# Patient Record
Sex: MALE | URBAN - METROPOLITAN AREA
[De-identification: names, ages, dates, MRNs, and addresses within clinical notes are randomized per-mention and may not be internally consistent; named-entity substitution may affect disease eponyms.]

---

## 2017-02-09 PROBLEM — M47.22 OSTEOARTHRITIS OF SPINE WITH RADICULOPATHY, CERVICAL REGION: Status: ACTIVE | Noted: 2017-02-09

## 2017-02-09 PROBLEM — M51.16 INTERVERTEBRAL DISC DISORDERS WITH RADICULOPATHY, LUMBAR REGION: Status: ACTIVE | Noted: 2017-02-09

## 2018-09-07 ENCOUNTER — HOSPITAL ENCOUNTER (OUTPATIENT)
Age: 58
Discharge: HOME | End: 2018-09-09
Payer: COMMERCIAL

## 2018-09-07 LAB
ALBUMIN SERPL-MCNC: 4.2 G/DL (ref 3.5–5.2)
ALP BLD-CCNC: 46 U/L (ref 40–129)
ALT SERPL-CCNC: 24 U/L (ref 0–40)
ANION GAP SERPL CALCULATED.3IONS-SCNC: 19 MMOL/L (ref 7–16)
AST SERPL-CCNC: 21 U/L (ref 0–39)
BASOPHILS ABSOLUTE: 0.07 E9/L (ref 0–0.2)
BASOPHILS RELATIVE PERCENT: 0.9 % (ref 0–2)
BILIRUB SERPL-MCNC: 0.2 MG/DL (ref 0–1.2)
BUN BLDV-MCNC: 26 MG/DL (ref 6–20)
CALCIUM SERPL-MCNC: 9.7 MG/DL (ref 8.6–10.2)
CHLORIDE BLD-SCNC: 99 MMOL/L (ref 98–107)
CHOLESTEROL, TOTAL: 194 MG/DL (ref 0–199)
CO2: 20 MMOL/L (ref 22–29)
CREAT SERPL-MCNC: 1.1 MG/DL (ref 0.7–1.2)
EOSINOPHILS ABSOLUTE: 0.13 E9/L (ref 0.05–0.5)
EOSINOPHILS RELATIVE PERCENT: 1.7 % (ref 0–6)
GFR AFRICAN AMERICAN: >60
GFR NON-AFRICAN AMERICAN: >60 ML/MIN/1.73
GLUCOSE BLD-MCNC: 160 MG/DL (ref 74–109)
HBA1C MFR BLD: 7.5 % (ref 4–5.6)
HCT VFR BLD CALC: 48.5 % (ref 37–54)
HDLC SERPL-MCNC: 23 MG/DL
HEMOGLOBIN: 15 G/DL (ref 12.5–16.5)
IMMATURE GRANULOCYTES #: 0.03 E9/L
IMMATURE GRANULOCYTES %: 0.4 % (ref 0–5)
LDL CHOLESTEROL CALCULATED: ABNORMAL MG/DL (ref 0–99)
LYMPHOCYTES ABSOLUTE: 1.74 E9/L (ref 1.5–4)
LYMPHOCYTES RELATIVE PERCENT: 22.2 % (ref 20–42)
MCH RBC QN AUTO: 27.5 PG (ref 26–35)
MCHC RBC AUTO-ENTMCNC: 30.9 % (ref 32–34.5)
MCV RBC AUTO: 88.8 FL (ref 80–99.9)
MICROALBUMIN UR-MCNC: 110 MG/L
MONOCYTES ABSOLUTE: 0.59 E9/L (ref 0.1–0.95)
MONOCYTES RELATIVE PERCENT: 7.5 % (ref 2–12)
NEUTROPHILS ABSOLUTE: 5.28 E9/L (ref 1.8–7.3)
NEUTROPHILS RELATIVE PERCENT: 67.3 % (ref 43–80)
PDW BLD-RTO: 15.1 FL (ref 11.5–15)
PLATELET # BLD: 411 E9/L (ref 130–450)
PMV BLD AUTO: 9.8 FL (ref 7–12)
POTASSIUM SERPL-SCNC: 4.5 MMOL/L (ref 3.5–5)
RBC # BLD: 5.46 E12/L (ref 3.8–5.8)
SODIUM BLD-SCNC: 138 MMOL/L (ref 132–146)
TOTAL PROTEIN: 7.7 G/DL (ref 6.4–8.3)
TRIGL SERPL-MCNC: 705 MG/DL (ref 0–149)
VLDLC SERPL CALC-MCNC: ABNORMAL MG/DL
WBC # BLD: 7.8 E9/L (ref 4.5–11.5)

## 2018-09-07 PROCEDURE — 80061 LIPID PANEL: CPT

## 2018-09-07 PROCEDURE — 80053 COMPREHEN METABOLIC PANEL: CPT

## 2018-09-07 PROCEDURE — 85025 COMPLETE CBC W/AUTO DIFF WBC: CPT

## 2018-09-07 PROCEDURE — 82044 UR ALBUMIN SEMIQUANTITATIVE: CPT

## 2018-09-07 PROCEDURE — 83036 HEMOGLOBIN GLYCOSYLATED A1C: CPT

## 2018-12-10 ENCOUNTER — HOSPITAL ENCOUNTER (OUTPATIENT)
Age: 58
Discharge: HOME | End: 2018-12-12
Payer: COMMERCIAL

## 2018-12-10 LAB
ALBUMIN SERPL-MCNC: 4.2 G/DL (ref 3.5–5.2)
ALP BLD-CCNC: 43 U/L (ref 40–129)
ALT SERPL-CCNC: 26 U/L (ref 0–40)
ANION GAP SERPL CALCULATED.3IONS-SCNC: 16 MMOL/L (ref 7–16)
AST SERPL-CCNC: 13 U/L (ref 0–39)
BASOPHILS ABSOLUTE: 0.07 E9/L (ref 0–0.2)
BASOPHILS RELATIVE PERCENT: 0.8 % (ref 0–2)
BILIRUB SERPL-MCNC: 0.3 MG/DL (ref 0–1.2)
BUN BLDV-MCNC: 23 MG/DL (ref 6–20)
CALCIUM SERPL-MCNC: 9.5 MG/DL (ref 8.6–10.2)
CHLORIDE BLD-SCNC: 101 MMOL/L (ref 98–107)
CHOLESTEROL, TOTAL: 159 MG/DL (ref 0–199)
CO2: 22 MMOL/L (ref 22–29)
CREAT SERPL-MCNC: 1.1 MG/DL (ref 0.7–1.2)
EOSINOPHILS ABSOLUTE: 0.16 E9/L (ref 0.05–0.5)
EOSINOPHILS RELATIVE PERCENT: 1.9 % (ref 0–6)
GFR AFRICAN AMERICAN: >60
GFR NON-AFRICAN AMERICAN: >60 ML/MIN/1.73
GLUCOSE BLD-MCNC: 115 MG/DL (ref 74–99)
HBA1C MFR BLD: 6.6 % (ref 4–5.6)
HCT VFR BLD CALC: 48.3 % (ref 37–54)
HDLC SERPL-MCNC: 28 MG/DL
HEMOGLOBIN: 15 G/DL (ref 12.5–16.5)
IMMATURE GRANULOCYTES #: 0.02 E9/L
IMMATURE GRANULOCYTES %: 0.2 % (ref 0–5)
LDL CHOLESTEROL CALCULATED: 55 MG/DL (ref 0–99)
LYMPHOCYTES ABSOLUTE: 2.6 E9/L (ref 1.5–4)
LYMPHOCYTES RELATIVE PERCENT: 30.6 % (ref 20–42)
MCH RBC QN AUTO: 27 PG (ref 26–35)
MCHC RBC AUTO-ENTMCNC: 31.1 % (ref 32–34.5)
MCV RBC AUTO: 87 FL (ref 80–99.9)
MICROALBUMIN UR-MCNC: <12 MG/L
MONOCYTES ABSOLUTE: 0.64 E9/L (ref 0.1–0.95)
MONOCYTES RELATIVE PERCENT: 7.5 % (ref 2–12)
NEUTROPHILS ABSOLUTE: 5.02 E9/L (ref 1.8–7.3)
NEUTROPHILS RELATIVE PERCENT: 59 % (ref 43–80)
PDW BLD-RTO: 15.5 FL (ref 11.5–15)
PLATELET # BLD: 312 E9/L (ref 130–450)
PMV BLD AUTO: 9.8 FL (ref 7–12)
POTASSIUM SERPL-SCNC: 3.9 MMOL/L (ref 3.5–5)
RBC # BLD: 5.55 E12/L (ref 3.8–5.8)
SODIUM BLD-SCNC: 139 MMOL/L (ref 132–146)
TOTAL PROTEIN: 7.1 G/DL (ref 6.4–8.3)
TRIGL SERPL-MCNC: 382 MG/DL (ref 0–149)
VLDLC SERPL CALC-MCNC: 76 MG/DL
WBC # BLD: 8.5 E9/L (ref 4.5–11.5)

## 2018-12-10 PROCEDURE — 80061 LIPID PANEL: CPT

## 2018-12-10 PROCEDURE — 83036 HEMOGLOBIN GLYCOSYLATED A1C: CPT

## 2018-12-10 PROCEDURE — 80053 COMPREHEN METABOLIC PANEL: CPT

## 2018-12-10 PROCEDURE — 85025 COMPLETE CBC W/AUTO DIFF WBC: CPT

## 2018-12-10 PROCEDURE — 82044 UR ALBUMIN SEMIQUANTITATIVE: CPT

## 2019-03-12 ENCOUNTER — HOSPITAL ENCOUNTER (OUTPATIENT)
Age: 59
Discharge: HOME | End: 2019-03-14
Payer: COMMERCIAL

## 2019-03-12 LAB
ALBUMIN SERPL-MCNC: 4.3 G/DL (ref 3.5–5.2)
ALP BLD-CCNC: 46 U/L (ref 40–129)
ALT SERPL-CCNC: 21 U/L (ref 0–40)
ANION GAP SERPL CALCULATED.3IONS-SCNC: 13 MMOL/L (ref 7–16)
AST SERPL-CCNC: 13 U/L (ref 0–39)
BASOPHILS ABSOLUTE: 0.08 E9/L (ref 0–0.2)
BASOPHILS RELATIVE PERCENT: 0.6 % (ref 0–2)
BILIRUB SERPL-MCNC: 0.3 MG/DL (ref 0–1.2)
BUN BLDV-MCNC: 26 MG/DL (ref 6–20)
CALCIUM SERPL-MCNC: 9.8 MG/DL (ref 8.6–10.2)
CHLORIDE BLD-SCNC: 98 MMOL/L (ref 98–107)
CHOLESTEROL, TOTAL: 182 MG/DL (ref 0–199)
CO2: 25 MMOL/L (ref 22–29)
CREAT SERPL-MCNC: 0.9 MG/DL (ref 0.7–1.2)
EOSINOPHILS ABSOLUTE: 0.05 E9/L (ref 0.05–0.5)
EOSINOPHILS RELATIVE PERCENT: 0.4 % (ref 0–6)
GFR AFRICAN AMERICAN: >60
GFR NON-AFRICAN AMERICAN: >60 ML/MIN/1.73
GLUCOSE BLD-MCNC: 208 MG/DL (ref 74–99)
HBA1C MFR BLD: 7.8 % (ref 4–5.6)
HCT VFR BLD CALC: 47.6 % (ref 37–54)
HDLC SERPL-MCNC: 28 MG/DL
HEMOGLOBIN: 15.7 G/DL (ref 12.5–16.5)
IMMATURE GRANULOCYTES #: 0.07 E9/L
IMMATURE GRANULOCYTES %: 0.5 % (ref 0–5)
LDL CHOLESTEROL CALCULATED: ABNORMAL MG/DL (ref 0–99)
LYMPHOCYTES ABSOLUTE: 2.03 E9/L (ref 1.5–4)
LYMPHOCYTES RELATIVE PERCENT: 14.7 % (ref 20–42)
MCH RBC QN AUTO: 29.3 PG (ref 26–35)
MCHC RBC AUTO-ENTMCNC: 33 % (ref 32–34.5)
MCV RBC AUTO: 88.8 FL (ref 80–99.9)
MICROALBUMIN UR-MCNC: 56.7 MG/L
MONOCYTES ABSOLUTE: 1 E9/L (ref 0.1–0.95)
MONOCYTES RELATIVE PERCENT: 7.2 % (ref 2–12)
NEUTROPHILS ABSOLUTE: 10.59 E9/L (ref 1.8–7.3)
NEUTROPHILS RELATIVE PERCENT: 76.6 % (ref 43–80)
PDW BLD-RTO: 14.7 FL (ref 11.5–15)
PLATELET # BLD: 314 E9/L (ref 130–450)
PMV BLD AUTO: 10.2 FL (ref 7–12)
POTASSIUM SERPL-SCNC: 4.2 MMOL/L (ref 3.5–5)
PROSTATE SPECIFIC ANTIGEN: 2.36 NG/ML (ref 0–4)
RBC # BLD: 5.36 E12/L (ref 3.8–5.8)
SODIUM BLD-SCNC: 136 MMOL/L (ref 132–146)
TOTAL PROTEIN: 7.5 G/DL (ref 6.4–8.3)
TRIGL SERPL-MCNC: 517 MG/DL (ref 0–149)
VLDLC SERPL CALC-MCNC: ABNORMAL MG/DL
WBC # BLD: 13.8 E9/L (ref 4.5–11.5)

## 2019-03-12 PROCEDURE — 82044 UR ALBUMIN SEMIQUANTITATIVE: CPT

## 2019-03-12 PROCEDURE — 80053 COMPREHEN METABOLIC PANEL: CPT

## 2019-03-12 PROCEDURE — G0103 PSA SCREENING: HCPCS

## 2019-03-12 PROCEDURE — 80061 LIPID PANEL: CPT

## 2019-03-12 PROCEDURE — 85025 COMPLETE CBC W/AUTO DIFF WBC: CPT

## 2019-03-12 PROCEDURE — 83036 HEMOGLOBIN GLYCOSYLATED A1C: CPT

## 2019-09-19 ENCOUNTER — HOSPITAL ENCOUNTER (OUTPATIENT)
Age: 59
Discharge: HOME | End: 2019-09-21
Payer: COMMERCIAL

## 2019-09-19 PROCEDURE — 80053 COMPREHEN METABOLIC PANEL: CPT

## 2019-09-19 PROCEDURE — 85025 COMPLETE CBC W/AUTO DIFF WBC: CPT

## 2019-09-19 PROCEDURE — 80061 LIPID PANEL: CPT

## 2019-09-19 PROCEDURE — 83036 HEMOGLOBIN GLYCOSYLATED A1C: CPT

## 2019-09-19 PROCEDURE — 82044 UR ALBUMIN SEMIQUANTITATIVE: CPT

## 2019-09-20 LAB
ALBUMIN SERPL-MCNC: 3.9 G/DL (ref 3.5–5.2)
ALP BLD-CCNC: 68 U/L (ref 40–129)
ALT SERPL-CCNC: 19 U/L (ref 0–40)
ANION GAP SERPL CALCULATED.3IONS-SCNC: 16 MMOL/L (ref 7–16)
AST SERPL-CCNC: 19 U/L (ref 0–39)
BASOPHILS ABSOLUTE: 0.07 E9/L (ref 0–0.2)
BASOPHILS RELATIVE PERCENT: 0.9 % (ref 0–2)
BILIRUB SERPL-MCNC: 0.3 MG/DL (ref 0–1.2)
BUN BLDV-MCNC: 20 MG/DL (ref 6–20)
CALCIUM SERPL-MCNC: 9.7 MG/DL (ref 8.6–10.2)
CHLORIDE BLD-SCNC: 95 MMOL/L (ref 98–107)
CHOLESTEROL, TOTAL: 265 MG/DL (ref 0–199)
CO2: 23 MMOL/L (ref 22–29)
CREAT SERPL-MCNC: 0.9 MG/DL (ref 0.7–1.2)
EOSINOPHILS ABSOLUTE: 0.19 E9/L (ref 0.05–0.5)
EOSINOPHILS RELATIVE PERCENT: 2.4 % (ref 0–6)
GFR AFRICAN AMERICAN: >60
GFR NON-AFRICAN AMERICAN: >60 ML/MIN/1.73
GLUCOSE BLD-MCNC: 260 MG/DL (ref 74–99)
HBA1C MFR BLD: 10.1 % (ref 4–5.6)
HCT VFR BLD CALC: 49.2 % (ref 37–54)
HDLC SERPL-MCNC: 22 MG/DL
HEMOGLOBIN: 15.6 G/DL (ref 12.5–16.5)
IMMATURE GRANULOCYTES #: 0.03 E9/L
IMMATURE GRANULOCYTES %: 0.4 % (ref 0–5)
LDL CHOLESTEROL CALCULATED: ABNORMAL MG/DL (ref 0–99)
LYMPHOCYTES ABSOLUTE: 2.12 E9/L (ref 1.5–4)
LYMPHOCYTES RELATIVE PERCENT: 26.5 % (ref 20–42)
MCH RBC QN AUTO: 30.7 PG (ref 26–35)
MCHC RBC AUTO-ENTMCNC: 31.7 % (ref 32–34.5)
MCV RBC AUTO: 96.9 FL (ref 80–99.9)
MICROALBUMIN UR-MCNC: 81.2 MG/L
MONOCYTES ABSOLUTE: 0.6 E9/L (ref 0.1–0.95)
MONOCYTES RELATIVE PERCENT: 7.5 % (ref 2–12)
NEUTROPHILS ABSOLUTE: 5 E9/L (ref 1.8–7.3)
NEUTROPHILS RELATIVE PERCENT: 62.3 % (ref 43–80)
PDW BLD-RTO: 13.7 FL (ref 11.5–15)
PLATELET # BLD: 260 E9/L (ref 130–450)
PMV BLD AUTO: 10.3 FL (ref 7–12)
POTASSIUM SERPL-SCNC: 4.5 MMOL/L (ref 3.5–5)
RBC # BLD: 5.08 E12/L (ref 3.8–5.8)
SODIUM BLD-SCNC: 134 MMOL/L (ref 132–146)
TOTAL PROTEIN: 8.1 G/DL (ref 6.4–8.3)
TRIGL SERPL-MCNC: 1391 MG/DL (ref 0–149)
VLDLC SERPL CALC-MCNC: ABNORMAL MG/DL
WBC # BLD: 8 E9/L (ref 4.5–11.5)

## 2020-01-30 ENCOUNTER — HOSPITAL ENCOUNTER (OUTPATIENT)
Age: 60
Discharge: HOME | End: 2020-02-01
Payer: COMMERCIAL

## 2020-01-30 LAB
ALBUMIN SERPL-MCNC: 4 G/DL (ref 3.5–5.2)
ALP BLD-CCNC: 69 U/L (ref 40–129)
ALT SERPL-CCNC: 20 U/L (ref 0–40)
ANION GAP SERPL CALCULATED.3IONS-SCNC: 17 MMOL/L (ref 7–16)
AST SERPL-CCNC: 18 U/L (ref 0–39)
BASOPHILS ABSOLUTE: 0.05 E9/L (ref 0–0.2)
BASOPHILS RELATIVE PERCENT: 0.8 % (ref 0–2)
BILIRUB SERPL-MCNC: <0.2 MG/DL (ref 0–1.2)
BUN BLDV-MCNC: 34 MG/DL (ref 6–20)
CALCIUM SERPL-MCNC: 9.6 MG/DL (ref 8.6–10.2)
CHLORIDE BLD-SCNC: 97 MMOL/L (ref 98–107)
CHOLESTEROL, TOTAL: 214 MG/DL (ref 0–199)
CO2: 21 MMOL/L (ref 22–29)
CREAT SERPL-MCNC: 1 MG/DL (ref 0.7–1.2)
EOSINOPHILS ABSOLUTE: 0.14 E9/L (ref 0.05–0.5)
EOSINOPHILS RELATIVE PERCENT: 2.2 % (ref 0–6)
GFR AFRICAN AMERICAN: >60
GFR NON-AFRICAN AMERICAN: >60 ML/MIN/1.73
GLUCOSE BLD-MCNC: 207 MG/DL (ref 74–99)
HBA1C MFR BLD: 9.3 % (ref 4–5.6)
HCT VFR BLD CALC: 47.8 % (ref 37–54)
HDLC SERPL-MCNC: 21 MG/DL
HEMOGLOBIN: 15.9 G/DL (ref 12.5–16.5)
IMMATURE GRANULOCYTES #: 0.02 E9/L
IMMATURE GRANULOCYTES %: 0.3 % (ref 0–5)
LDL CHOLESTEROL CALCULATED: ABNORMAL MG/DL (ref 0–99)
LYMPHOCYTES ABSOLUTE: 1.72 E9/L (ref 1.5–4)
LYMPHOCYTES RELATIVE PERCENT: 27.4 % (ref 20–42)
MCH RBC QN AUTO: 31.4 PG (ref 26–35)
MCHC RBC AUTO-ENTMCNC: 33.3 % (ref 32–34.5)
MCV RBC AUTO: 94.5 FL (ref 80–99.9)
MICROALBUMIN UR-MCNC: 19.2 MG/L
MONOCYTES ABSOLUTE: 0.56 E9/L (ref 0.1–0.95)
MONOCYTES RELATIVE PERCENT: 8.9 % (ref 2–12)
NEUTROPHILS ABSOLUTE: 3.79 E9/L (ref 1.8–7.3)
NEUTROPHILS RELATIVE PERCENT: 60.4 % (ref 43–80)
PDW BLD-RTO: 13.2 FL (ref 11.5–15)
PLATELET # BLD: 334 E9/L (ref 130–450)
PMV BLD AUTO: 9.9 FL (ref 7–12)
POTASSIUM SERPL-SCNC: 4.3 MMOL/L (ref 3.5–5)
RBC # BLD: 5.06 E12/L (ref 3.8–5.8)
SODIUM BLD-SCNC: 135 MMOL/L (ref 132–146)
TOTAL PROTEIN: 6.9 G/DL (ref 6.4–8.3)
TRIGL SERPL-MCNC: 1407 MG/DL (ref 0–149)
VLDLC SERPL CALC-MCNC: ABNORMAL MG/DL
WBC # BLD: 6.3 E9/L (ref 4.5–11.5)

## 2020-01-30 PROCEDURE — 80061 LIPID PANEL: CPT

## 2020-01-30 PROCEDURE — 83036 HEMOGLOBIN GLYCOSYLATED A1C: CPT

## 2020-01-30 PROCEDURE — 82044 UR ALBUMIN SEMIQUANTITATIVE: CPT

## 2020-01-30 PROCEDURE — 85025 COMPLETE CBC W/AUTO DIFF WBC: CPT

## 2020-01-30 PROCEDURE — 80053 COMPREHEN METABOLIC PANEL: CPT

## 2020-04-27 ENCOUNTER — HOSPITAL ENCOUNTER (OUTPATIENT)
Age: 60
Discharge: HOME | End: 2020-04-29
Payer: COMMERCIAL

## 2020-04-27 LAB
ALBUMIN SERPL-MCNC: 4.3 G/DL (ref 3.5–5.2)
ALP BLD-CCNC: 55 U/L (ref 40–129)
ALT SERPL-CCNC: 23 U/L (ref 0–40)
ANION GAP SERPL CALCULATED.3IONS-SCNC: 15 MMOL/L (ref 7–16)
AST SERPL-CCNC: 16 U/L (ref 0–39)
BASOPHILS ABSOLUTE: 0.08 E9/L (ref 0–0.2)
BASOPHILS RELATIVE PERCENT: 0.8 % (ref 0–2)
BILIRUB SERPL-MCNC: 0.4 MG/DL (ref 0–1.2)
BUN BLDV-MCNC: 25 MG/DL (ref 6–20)
CALCIUM SERPL-MCNC: 10.1 MG/DL (ref 8.6–10.2)
CHLORIDE BLD-SCNC: 100 MMOL/L (ref 98–107)
CHOLESTEROL, TOTAL: 185 MG/DL (ref 0–199)
CO2: 23 MMOL/L (ref 22–29)
CREAT SERPL-MCNC: 1 MG/DL (ref 0.7–1.2)
EOSINOPHILS ABSOLUTE: 0.16 E9/L (ref 0.05–0.5)
EOSINOPHILS RELATIVE PERCENT: 1.6 % (ref 0–6)
GFR AFRICAN AMERICAN: >60
GFR NON-AFRICAN AMERICAN: >60 ML/MIN/1.73
GLUCOSE BLD-MCNC: 214 MG/DL (ref 74–99)
HBA1C MFR BLD: 8.1 % (ref 4–5.6)
HCT VFR BLD CALC: 53 % (ref 37–54)
HDLC SERPL-MCNC: 33 MG/DL
HEMOGLOBIN: 17.3 G/DL (ref 12.5–16.5)
IMMATURE GRANULOCYTES #: 0.05 E9/L
IMMATURE GRANULOCYTES %: 0.5 % (ref 0–5)
LDL CHOLESTEROL CALCULATED: ABNORMAL MG/DL (ref 0–99)
LYMPHOCYTES ABSOLUTE: 3.02 E9/L (ref 1.5–4)
LYMPHOCYTES RELATIVE PERCENT: 30.1 % (ref 20–42)
MCH RBC QN AUTO: 31 PG (ref 26–35)
MCHC RBC AUTO-ENTMCNC: 32.6 % (ref 32–34.5)
MCV RBC AUTO: 95 FL (ref 80–99.9)
MICROALBUMIN UR-MCNC: 348.4 MG/L
MONOCYTES ABSOLUTE: 0.9 E9/L (ref 0.1–0.95)
MONOCYTES RELATIVE PERCENT: 9 % (ref 2–12)
NEUTROPHILS ABSOLUTE: 5.83 E9/L (ref 1.8–7.3)
NEUTROPHILS RELATIVE PERCENT: 58 % (ref 43–80)
PDW BLD-RTO: 13.2 FL (ref 11.5–15)
PLATELET # BLD: 346 E9/L (ref 130–450)
PMV BLD AUTO: 10.1 FL (ref 7–12)
POTASSIUM SERPL-SCNC: 4.3 MMOL/L (ref 3.5–5)
RBC # BLD: 5.58 E12/L (ref 3.8–5.8)
SODIUM BLD-SCNC: 138 MMOL/L (ref 132–146)
TOTAL PROTEIN: 7.8 G/DL (ref 6.4–8.3)
TRIGL SERPL-MCNC: 514 MG/DL (ref 0–149)
TSH SERPL DL<=0.05 MIU/L-ACNC: 0.62 UIU/ML (ref 0.27–4.2)
VLDLC SERPL CALC-MCNC: ABNORMAL MG/DL
WBC # BLD: 10 E9/L (ref 4.5–11.5)

## 2020-04-27 PROCEDURE — 85025 COMPLETE CBC W/AUTO DIFF WBC: CPT

## 2020-04-27 PROCEDURE — 84443 ASSAY THYROID STIM HORMONE: CPT

## 2020-04-27 PROCEDURE — 80061 LIPID PANEL: CPT

## 2020-04-27 PROCEDURE — 80053 COMPREHEN METABOLIC PANEL: CPT

## 2020-04-27 PROCEDURE — 83036 HEMOGLOBIN GLYCOSYLATED A1C: CPT

## 2020-04-27 PROCEDURE — G0103 PSA SCREENING: HCPCS

## 2020-04-27 PROCEDURE — 82044 UR ALBUMIN SEMIQUANTITATIVE: CPT

## 2020-04-28 LAB — PROSTATE SPECIFIC ANTIGEN: 2.25 NG/ML (ref 0–4)

## 2020-07-27 ENCOUNTER — HOSPITAL ENCOUNTER (OUTPATIENT)
Age: 60
Discharge: HOME | End: 2020-07-29
Payer: COMMERCIAL

## 2020-07-27 LAB
ALBUMIN SERPL-MCNC: 4.2 G/DL (ref 3.5–5.2)
ALP BLD-CCNC: 51 U/L (ref 40–129)
ALT SERPL-CCNC: 29 U/L (ref 0–40)
ANION GAP SERPL CALCULATED.3IONS-SCNC: 17 MMOL/L (ref 7–16)
AST SERPL-CCNC: 18 U/L (ref 0–39)
BASOPHILS ABSOLUTE: 0.07 E9/L (ref 0–0.2)
BASOPHILS RELATIVE PERCENT: 0.8 % (ref 0–2)
BILIRUB SERPL-MCNC: 0.3 MG/DL (ref 0–1.2)
BUN BLDV-MCNC: 23 MG/DL (ref 6–20)
CALCIUM SERPL-MCNC: 10.7 MG/DL (ref 8.6–10.2)
CHLORIDE BLD-SCNC: 96 MMOL/L (ref 98–107)
CHOLESTEROL, TOTAL: 170 MG/DL (ref 0–199)
CO2: 24 MMOL/L (ref 22–29)
CREAT SERPL-MCNC: 1.2 MG/DL (ref 0.7–1.2)
EOSINOPHILS ABSOLUTE: 0.13 E9/L (ref 0.05–0.5)
EOSINOPHILS RELATIVE PERCENT: 1.5 % (ref 0–6)
GFR AFRICAN AMERICAN: >60
GFR NON-AFRICAN AMERICAN: >60 ML/MIN/1.73
GLUCOSE BLD-MCNC: 184 MG/DL (ref 74–99)
HBA1C MFR BLD: 8.9 % (ref 4–5.6)
HCT VFR BLD CALC: 50.1 % (ref 37–54)
HDLC SERPL-MCNC: 23 MG/DL
HEMOGLOBIN: 16.5 G/DL (ref 12.5–16.5)
IMMATURE GRANULOCYTES #: 0.03 E9/L
IMMATURE GRANULOCYTES %: 0.4 % (ref 0–5)
LDL CHOLESTEROL CALCULATED: ABNORMAL MG/DL (ref 0–99)
LYMPHOCYTES ABSOLUTE: 2.01 E9/L (ref 1.5–4)
LYMPHOCYTES RELATIVE PERCENT: 23.7 % (ref 20–42)
MCH RBC QN AUTO: 32 PG (ref 26–35)
MCHC RBC AUTO-ENTMCNC: 32.9 % (ref 32–34.5)
MCV RBC AUTO: 97.1 FL (ref 80–99.9)
MICROALBUMIN UR-MCNC: 115.5 MG/L
MONOCYTES ABSOLUTE: 0.77 E9/L (ref 0.1–0.95)
MONOCYTES RELATIVE PERCENT: 9.1 % (ref 2–12)
NEUTROPHILS ABSOLUTE: 5.47 E9/L (ref 1.8–7.3)
NEUTROPHILS RELATIVE PERCENT: 64.5 % (ref 43–80)
PDW BLD-RTO: 13.1 FL (ref 11.5–15)
PLATELET # BLD: 335 E9/L (ref 130–450)
PMV BLD AUTO: 9.9 FL (ref 7–12)
POTASSIUM SERPL-SCNC: 4.2 MMOL/L (ref 3.5–5)
RBC # BLD: 5.16 E12/L (ref 3.8–5.8)
SODIUM BLD-SCNC: 137 MMOL/L (ref 132–146)
TOTAL PROTEIN: 7.5 G/DL (ref 6.4–8.3)
TRIGL SERPL-MCNC: 744 MG/DL (ref 0–149)
VLDLC SERPL CALC-MCNC: ABNORMAL MG/DL
WBC # BLD: 8.5 E9/L (ref 4.5–11.5)

## 2020-07-27 PROCEDURE — 82044 UR ALBUMIN SEMIQUANTITATIVE: CPT

## 2020-07-27 PROCEDURE — 85025 COMPLETE CBC W/AUTO DIFF WBC: CPT

## 2020-07-27 PROCEDURE — 80061 LIPID PANEL: CPT

## 2020-07-27 PROCEDURE — 83036 HEMOGLOBIN GLYCOSYLATED A1C: CPT

## 2020-07-27 PROCEDURE — 80053 COMPREHEN METABOLIC PANEL: CPT

## 2020-10-30 ENCOUNTER — HOSPITAL ENCOUNTER (OUTPATIENT)
Age: 60
Discharge: HOME | End: 2020-11-01
Payer: COMMERCIAL

## 2020-10-30 LAB
ALBUMIN SERPL-MCNC: 4.2 G/DL (ref 3.5–5.2)
ALP BLD-CCNC: 64 U/L (ref 40–129)
ALT SERPL-CCNC: 27 U/L (ref 0–40)
ANION GAP SERPL CALCULATED.3IONS-SCNC: 13 MMOL/L (ref 7–16)
AST SERPL-CCNC: 13 U/L (ref 0–39)
BASOPHILS ABSOLUTE: 0.06 E9/L (ref 0–0.2)
BASOPHILS RELATIVE PERCENT: 0.8 % (ref 0–2)
BILIRUB SERPL-MCNC: 0.4 MG/DL (ref 0–1.2)
BUN BLDV-MCNC: 20 MG/DL (ref 6–20)
CALCIUM SERPL-MCNC: 10 MG/DL (ref 8.6–10.2)
CHLORIDE BLD-SCNC: 96 MMOL/L (ref 98–107)
CHOLESTEROL, TOTAL: 217 MG/DL (ref 0–199)
CO2: 24 MMOL/L (ref 22–29)
CREAT SERPL-MCNC: 0.9 MG/DL (ref 0.7–1.2)
EOSINOPHILS ABSOLUTE: 0.19 E9/L (ref 0.05–0.5)
EOSINOPHILS RELATIVE PERCENT: 2.5 % (ref 0–6)
GFR AFRICAN AMERICAN: >60
GFR NON-AFRICAN AMERICAN: >60 ML/MIN/1.73
GLUCOSE BLD-MCNC: 202 MG/DL (ref 74–99)
HBA1C MFR BLD: 9.3 % (ref 4–5.6)
HCT VFR BLD CALC: 50.6 % (ref 37–54)
HDLC SERPL-MCNC: 22 MG/DL
HEMOGLOBIN: 17.1 G/DL (ref 12.5–16.5)
IMMATURE GRANULOCYTES #: 0.03 E9/L
IMMATURE GRANULOCYTES %: 0.4 % (ref 0–5)
LDL CHOLESTEROL CALCULATED: ABNORMAL MG/DL (ref 0–99)
LYMPHOCYTES ABSOLUTE: 2.78 E9/L (ref 1.5–4)
LYMPHOCYTES RELATIVE PERCENT: 36.8 % (ref 20–42)
MCH RBC QN AUTO: 30.4 PG (ref 26–35)
MCHC RBC AUTO-ENTMCNC: 33.8 % (ref 32–34.5)
MCV RBC AUTO: 90 FL (ref 80–99.9)
MICROALBUMIN UR-MCNC: 527.1 MG/L
MONOCYTES ABSOLUTE: 0.57 E9/L (ref 0.1–0.95)
MONOCYTES RELATIVE PERCENT: 7.5 % (ref 2–12)
NEUTROPHILS ABSOLUTE: 3.92 E9/L (ref 1.8–7.3)
NEUTROPHILS RELATIVE PERCENT: 52 % (ref 43–80)
PDW BLD-RTO: 13.2 FL (ref 11.5–15)
PLATELET # BLD: 316 E9/L (ref 130–450)
PMV BLD AUTO: 9.9 FL (ref 7–12)
POTASSIUM SERPL-SCNC: 4 MMOL/L (ref 3.5–5)
RBC # BLD: 5.62 E12/L (ref 3.8–5.8)
SODIUM BLD-SCNC: 133 MMOL/L (ref 132–146)
TOTAL PROTEIN: 7.4 G/DL (ref 6.4–8.3)
TRIGL SERPL-MCNC: 1010 MG/DL (ref 0–149)
VLDLC SERPL CALC-MCNC: ABNORMAL MG/DL
WBC # BLD: 7.6 E9/L (ref 4.5–11.5)

## 2020-10-30 PROCEDURE — 80061 LIPID PANEL: CPT

## 2020-10-30 PROCEDURE — 80053 COMPREHEN METABOLIC PANEL: CPT

## 2020-10-30 PROCEDURE — 85025 COMPLETE CBC W/AUTO DIFF WBC: CPT

## 2020-10-30 PROCEDURE — 83036 HEMOGLOBIN GLYCOSYLATED A1C: CPT

## 2020-10-30 PROCEDURE — 82044 UR ALBUMIN SEMIQUANTITATIVE: CPT

## 2021-01-26 LAB
ALBUMIN SERPL-MCNC: 3.9 G/DL (ref 3.5–5.2)
ALP BLD-CCNC: 66 U/L (ref 40–129)
ALT SERPL-CCNC: 31 U/L (ref 0–40)
ANION GAP SERPL CALCULATED.3IONS-SCNC: 11 MMOL/L (ref 7–16)
AST SERPL-CCNC: 21 U/L (ref 0–39)
BASOPHILS ABSOLUTE: 0.05 E9/L (ref 0–0.2)
BASOPHILS RELATIVE PERCENT: 0.8 % (ref 0–2)
BILIRUB SERPL-MCNC: 0.3 MG/DL (ref 0–1.2)
BUN BLDV-MCNC: 22 MG/DL (ref 6–20)
CALCIUM SERPL-MCNC: 9.2 MG/DL (ref 8.6–10.2)
CHLORIDE BLD-SCNC: 99 MMOL/L (ref 98–107)
CHOLESTEROL, TOTAL: 189 MG/DL (ref 0–199)
CO2: 24 MMOL/L (ref 22–29)
CREAT SERPL-MCNC: 1 MG/DL (ref 0.7–1.2)
EOSINOPHILS ABSOLUTE: 0.13 E9/L (ref 0.05–0.5)
EOSINOPHILS RELATIVE PERCENT: 2 % (ref 0–6)
GFR AFRICAN AMERICAN: >60
GFR NON-AFRICAN AMERICAN: >60 ML/MIN/1.73
GLUCOSE BLD-MCNC: 233 MG/DL (ref 74–99)
HBA1C MFR BLD: 9.1 % (ref 4–5.6)
HCT VFR BLD CALC: 49.9 % (ref 37–54)
HDLC SERPL-MCNC: 21 MG/DL
HEMOGLOBIN: 16.9 G/DL (ref 12.5–16.5)
IMMATURE GRANULOCYTES #: 0.03 E9/L
IMMATURE GRANULOCYTES %: 0.5 % (ref 0–5)
LDL CHOLESTEROL CALCULATED: ABNORMAL MG/DL (ref 0–99)
LYMPHOCYTES ABSOLUTE: 1.93 E9/L (ref 1.5–4)
LYMPHOCYTES RELATIVE PERCENT: 29.8 % (ref 20–42)
MCH RBC QN AUTO: 31.2 PG (ref 26–35)
MCHC RBC AUTO-ENTMCNC: 33.9 % (ref 32–34.5)
MCV RBC AUTO: 92.1 FL (ref 80–99.9)
MICROALBUMIN UR-MCNC: 120.8 MG/L
MONOCYTES ABSOLUTE: 0.53 E9/L (ref 0.1–0.95)
MONOCYTES RELATIVE PERCENT: 8.2 % (ref 2–12)
NEUTROPHILS ABSOLUTE: 3.8 E9/L (ref 1.8–7.3)
NEUTROPHILS RELATIVE PERCENT: 58.7 % (ref 43–80)
PDW BLD-RTO: 13.2 FL (ref 11.5–15)
PLATELET # BLD: 289 E9/L (ref 130–450)
PMV BLD AUTO: 10.1 FL (ref 7–12)
POTASSIUM SERPL-SCNC: 4 MMOL/L (ref 3.5–5)
PROSTATE SPECIFIC ANTIGEN: 2.6 NG/ML (ref 0–4)
RBC # BLD: 5.42 E12/L (ref 3.8–5.8)
SODIUM BLD-SCNC: 134 MMOL/L (ref 132–146)
TOTAL PROTEIN: 7.1 G/DL (ref 6.4–8.3)
TRIGL SERPL-MCNC: 1112 MG/DL (ref 0–149)
TSH SERPL DL<=0.05 MIU/L-ACNC: 1.69 UIU/ML (ref 0.27–4.2)
VLDLC SERPL CALC-MCNC: ABNORMAL MG/DL
WBC # BLD: 6.5 E9/L (ref 4.5–11.5)

## 2021-05-06 ENCOUNTER — OFFICE VISIT (OUTPATIENT)
Dept: FAMILY MEDICINE CLINIC | Age: 61
End: 2021-05-06
Payer: COMMERCIAL

## 2021-05-06 VITALS
DIASTOLIC BLOOD PRESSURE: 78 MMHG | WEIGHT: 282 LBS | SYSTOLIC BLOOD PRESSURE: 124 MMHG | BODY MASS INDEX: 35.06 KG/M2 | OXYGEN SATURATION: 98 % | HEIGHT: 75 IN | HEART RATE: 71 BPM | RESPIRATION RATE: 17 BRPM | TEMPERATURE: 98.3 F

## 2021-05-06 DIAGNOSIS — R50.9 FEVER, UNSPECIFIED FEVER CAUSE: ICD-10-CM

## 2021-05-06 DIAGNOSIS — R51.9 ACUTE NONINTRACTABLE HEADACHE, UNSPECIFIED HEADACHE TYPE: ICD-10-CM

## 2021-05-06 DIAGNOSIS — R52 BODY ACHES: Primary | ICD-10-CM

## 2021-05-06 LAB
Lab: NORMAL
PERFORMING INSTRUMENT: NORMAL
QC PASS/FAIL: NORMAL
SARS-COV-2, POC: NORMAL

## 2021-05-06 PROCEDURE — 99213 OFFICE O/P EST LOW 20 MIN: CPT | Performed by: PHYSICIAN ASSISTANT

## 2021-05-06 PROCEDURE — 87426 SARSCOV CORONAVIRUS AG IA: CPT | Performed by: PHYSICIAN ASSISTANT

## 2021-05-06 RX ORDER — AZITHROMYCIN 250 MG/1
TABLET, FILM COATED ORAL
Qty: 6 TABLET | Refills: 0 | Status: SHIPPED | OUTPATIENT
Start: 2021-05-06

## 2021-05-06 SDOH — ECONOMIC STABILITY: TRANSPORTATION INSECURITY
IN THE PAST 12 MONTHS, HAS THE LACK OF TRANSPORTATION KEPT YOU FROM MEDICAL APPOINTMENTS OR FROM GETTING MEDICATIONS?: NO

## 2021-05-06 SDOH — ECONOMIC STABILITY: FOOD INSECURITY: WITHIN THE PAST 12 MONTHS, YOU WORRIED THAT YOUR FOOD WOULD RUN OUT BEFORE YOU GOT MONEY TO BUY MORE.: NEVER TRUE

## 2021-05-06 ASSESSMENT — PATIENT HEALTH QUESTIONNAIRE - PHQ9: SUM OF ALL RESPONSES TO PHQ9 QUESTIONS 1 & 2: 0

## 2021-05-06 NOTE — PATIENT INSTRUCTIONS
These medicines help prevent blood clots. People with severe illness are at risk for blood clots. How can you protect yourself and others? The best way to protect yourself from getting sick is to:  · Avoid areas where there is an outbreak. · Avoid contact with people who may be infected. · Avoid crowds and try to stay at least 6 feet away from other people. · Wash your hands often, especially after you cough or sneeze. Use soap and water, and scrub for at least 20 seconds. If soap and water aren't available, use an alcohol-based hand . · Avoid touching your mouth, nose, and eyes. To help avoid spreading the virus to others:  · Stay home if you are sick or have been exposed to the virus. Don't go to school, work, or public areas. And don't use public transportation, ride-shares, or taxis unless you have no choice. · Wear a cloth face cover if you have to go to public areas. · Cover your mouth with a tissue when you cough or sneeze. Then throw the tissue in the trash and wash your hands right away. · If you're sick:  ? Leave your home only if you need to get medical care. But call the doctor's office first so they know you're coming. And wear a face cover. ? Wear the face cover whenever you're around other people. It can help stop the spread of the virus. ? Limit contact with pets and people in your home. If possible, stay in a separate bedroom and use a separate bathroom. ? Clean and disinfect your home every day. Use household  and disinfectant wipes or sprays. Take special care to clean things that you grab with your hands. These include doorknobs, remote controls, phones, and handles on your refrigerator and microwave. And don't forget countertops, tabletops, bathrooms, and computer keyboards. When should you call for help? Call 911 anytime you think you may need emergency care.  For example, call if you have life-threatening symptoms, such as:    · You have severe trouble breathing. (You can't talk at all.)     · You have constant chest pain or pressure.     · You are severely dizzy or lightheaded.     · You are confused or can't think clearly.     · Your face and lips have a blue color.     · You pass out (lose consciousness) or are very hard to wake up. Call your doctor now or seek immediate medical care if:    · You have moderate trouble breathing. (You can't speak a full sentence.)     · You are coughing up blood (more than about 1 teaspoon).     · You have signs of low blood pressure. These include feeling lightheaded; being too weak to stand; and having cold, pale, clammy skin. Watch closely for changes in your health, and be sure to contact your doctor if:    · Your symptoms get worse.     · You are not getting better as expected. Call before you go to the doctor's office. Follow their instructions. And wear a cloth face cover. Current as of: February 19, 2021               Content Version: 12.8  © 2006-2021 [employer]. Care instructions adapted under license by [facility] ([facility]). If you have questions about a medical condition or this instruction, always ask your healthcare professional. [place] [address] any warranty or liability for your use of this information. Patient Education        Headache: Care Instructions  Your Care Instructions     Headaches have many possible causes. Most headaches aren't a sign of a more serious problem, and they will get better on their own. Home treatment may help you feel better faster. The doctor has checked you carefully, but problems can develop later. If you notice any problems or new symptoms, get medical treatment right away. Follow-up care is a key part of your treatment and safety. Be sure to make and go to all appointments, and call your doctor if you are having problems. It's also a good idea to know your test results and keep a list of the medicines you take.   How can you care for yourself at home? · Do not drive if you have taken a prescription pain medicine. · Rest in a quiet, dark room until your headache is gone. Close your eyes and try to relax or go to sleep. Don't watch TV or read. · Put a cold, moist cloth or cold pack on the painful area for 10 to 20 minutes at a time. Put a thin cloth between the cold pack and your skin. · Use a warm, moist towel or a heating pad set on low to relax tight shoulder and neck muscles. · Have someone gently massage your neck and shoulders. · Take pain medicines exactly as directed. ? If the doctor gave you a prescription medicine for pain, take it as prescribed. ? If you are not taking a prescription pain medicine, ask your doctor if you can take an over-the-counter medicine. · Be careful not to take pain medicine more often than the instructions allow, because you may get worse or more frequent headaches when the medicine wears off. · Do not ignore new symptoms that occur with a headache, such as a fever, weakness or numbness, vision changes, or confusion. These may be signs of a more serious problem. To prevent headaches  · Keep a headache diary so you can figure out what triggers your headaches. Avoiding triggers may help you prevent headaches. Record when each headache began, how long it lasted, and what the pain was like (throbbing, aching, stabbing, or dull). Write down any other symptoms you had with the headache, such as nausea, flashing lights or dark spots, or sensitivity to bright light or loud noise. Note if the headache occurred near your period. List anything that might have triggered the headache, such as certain foods (chocolate, cheese, wine) or odors, smoke, bright light, stress, or lack of sleep. · Find healthy ways to deal with stress. Headaches are most common during or right after stressful times.  Take time to relax before and after you do something that has caused a headache in the past.  · Try to keep your muscles relaxed by keeping good posture. Check your jaw, face, neck, and shoulder muscles for tension, and try relaxing them. When sitting at a desk, change positions often, and stretch for 30 seconds each hour. · Get plenty of sleep and exercise. · Eat regularly and well. Long periods without food can trigger a headache. · Treat yourself to a massage. Some people find that regular massages are very helpful in relieving tension. · Limit caffeine by not drinking too much coffee, tea, or soda. But don't quit caffeine suddenly, because that can also give you headaches. · Reduce eyestrain from computers by blinking frequently and looking away from the computer screen every so often. Make sure you have proper eyewear and that your monitor is set up properly, about an arm's length away. · Seek help if you have depression or anxiety. Your headaches may be linked to these conditions. Treatment can both prevent headaches and help with symptoms of anxiety or depression. When should you call for help? Call 911 anytime you think you may need emergency care. For example, call if:    · You have signs of a stroke. These may include:  ? Sudden numbness, paralysis, or weakness in your face, arm, or leg, especially on only one side of your body. ? Sudden vision changes. ? Sudden trouble speaking. ? Sudden confusion or trouble understanding simple statements. ? Sudden problems with walking or balance. ? A sudden, severe headache that is different from past headaches. Call your doctor now or seek immediate medical care if:    · You have a new or worse headache.     · Your headache gets much worse. Where can you learn more? Go to https://11i Solutionsqian.Neiron. org and sign in to your Viverae account. Enter 9188 7221 in the The Kernel box to learn more about \"Headache: Care Instructions. \"     If you do not have an account, please click on the \"Sign Up Now\" link.   Current as of: August 4, 2020               Content Version: 12.8  © 2006-2021 [employer]. Care instructions adapted under license by [facility] ([facility]). If you have questions about a medical condition or this instruction, always ask your healthcare professional. [address] any warranty or liability for your use of this information. Patient Education        Learning About Fever  What is a fever? A fever is a high body temperature. It's one way your body fights being sick. A fever shows that the body is responding to infection or other illnesses, both minor and severe. A fever is a symptom, not an illness by itself. A fever can be a sign that you are ill, but most fevers are not caused by a serious problem. You may have a fever with a minor illness, such as a cold. But sometimes a very serious infection may cause little or no fever. It is important to look at other symptoms, other conditions you have, and how you feel in general. In children, notice how they act and see what symptoms they complain of. What is a normal body temperature? A normal body temperature is about 98. 6ºF. Some people have a normal temperature that is a little higher or a little lower than this. Your temperature may be a little lower in the morning than it is later in the day. It may go up during hot weather or when you exercise, wear heavy clothes, or take a hot bath. Your temperature may also be different depending on how you take it. A temperature taken in the mouth (oral) or under the arm may be a little lower than your core temperature (rectal). What is a fever temperature? A core temperature of 100.4°F or above is considered a fever. What can cause a fever? A fever may be caused by:  · Infections. This is the most common cause of a fever. Examples of infections that can cause a fever include the flu, a kidney infection, or pneumonia. · Some medicines.   · Severe trauma or injury, such as a heart attack, stroke, heatstroke, or burns. · Other medical conditions, such as arthritis and some cancers. How can you treat a fever at home? · Ask your doctor if you can take an over-the-counter pain medicine, such as acetaminophen (Tylenol), ibuprofen (Advil, Motrin), or naproxen (Aleve). Be safe with medicines. Read and follow all instructions on the label. · To prevent dehydration, drink plenty of fluids. Choose water and other caffeine-free clear liquids until you feel better. If you have kidney, heart, or liver disease and have to limit fluids, talk with your doctor before you increase the amount of fluids you drink. Follow-up care is a key part of your treatment and safety. Be sure to make and go to all appointments, and call your doctor if you are having problems. It's also a good idea to know your test results and keep a list of the medicines you take. Where can you learn more? Go to https://Alereon.Topera. org and sign in to your Shustir account. Enter T850 in the Abazab box to learn more about \"Learning About Fever. \"     If you do not have an account, please click on the \"Sign Up Now\" link. Current as of: February 26, 2020               Content Version: 12.8  © 2006-2021 [employer]. Care instructions adapted under license by [facility] ([facility]). If you have questions about a medical condition or this instruction, always ask your healthcare professional. [place] [address] any warranty or liability for your use of this information.

## 2021-05-06 NOTE — PROGRESS NOTES
m)   Wt 282 lb (127.9 kg)   SpO2 98%   BMI 35.25 kg/m²    Oxygen Saturation Interpretation: Normal.    Constitutional:  Alert, development consistent with age. NAD. Head:  NC/NT. Airway patent. Eyes: PERRLA, EOM's intact  Ears: TM's dull, no erythema  Nose: Turbinates swollen, some mild injection, some yellow tinged drainage. Mouth: Posterior pharynx with moderate erythema and thick yellow tinged postnasal drip. Some mild uvular edema. Neck:  Normal ROM. Supple. Some tender anterior cervical adenopathy. No rigidity and no stridor. Lungs: Essentially clear to ausculation bilaterally without wheezes, rales, or rhonchi. CV:  Regular rate and rhythm, normal heart sounds, with no obvious ectopy. Skin:  Normal turgor. Warm and dry without visible rash. Lymphatic: No lymphangitis or adenopathy noted. Neurological:  Oriented. Motor functions intact. Lab / Imaging Results   (All laboratory and radiology results have been personally reviewed by myself)  Labs:  Results for orders placed or performed in visit on 05/06/21   POCT COVID-19, Antigen   Result Value Ref Range    SARS-COV-2, POC Not-Detected Not Detected    Lot Number [id number]     QC Pass/Fail pass     Performing Instrument BD Veritor        Imaging: All Radiology results interpreted by Radiologist unless otherwise noted. No results found. Medical Decision Making   Pt non-toxic, in no apparent distress and stable at time of discharge. Assessment/Plan   Malika Salinas was seen today for headache.     Diagnoses and all orders for this visit:    Body aches  -     azithromycin (ZITHROMAX) 250 MG tablet; 500mg on day 1 followed by 250mg on days 2 - 5  -     POCT COVID-19, Antigen    Fever, unspecified fever cause  -     azithromycin (ZITHROMAX) 250 MG tablet; 500mg on day 1 followed by 250mg on days 2 - 5  -     POCT COVID-19, Antigen    Acute nonintractable headache, unspecified headache type  -     azithromycin (ZITHROMAX) 250 MG tablet; 500mg on day 1 followed by 250mg on days 2 - 5  -     POCT COVID-19, Antigen        COVID-19 rapid swab obtained and negative, patient declined COVID PCR. Offered Flu swab and CXR as well, but patient declined. Advised cautionary self-quarantine at home in the interim. Note for off work given until Monday. Advised to [employer]. Recommend plenty of clear fluids and rest. Tylenol advised for fever or headache. Pt should also be fever free for 24 hours and symptoms should be improved overall prior to returning. Rx given for Zithromax, UAD. Most likely viral process and patient aware. Close follow up with PCP early next week if not improving and to ED sooner if symptoms worsen or change. ED immediately with high or refractory fever, progressive SOB, dyspnea, CP, calf pain/swelling, shaking chills, vomiting, abdominal pain, lethargy, flank pain, or decreased urinary output. Pt verbalizes understanding and is in agreement with plan of care. All questions answered. Jorge Malone PA-C    This visit was provided as a focused evaluation during the COVID -19 pandemic/national emergency. A comprehensive review of all previous patient history and testing was not conducted. Pertinent findings were elicited during the visit.

## 2021-05-06 NOTE — LETTER
[address] [place] [address] [place] [address]  Phone: [phone]  Fax: [address] [place]        May 6, 2021     Patient: Carlos Ortega   YOB: 1960   Date of Visit: 5/6/2021       To Whom It May Concern: It is my medical opinion that Kavya Boateng may return to work on 5-. Please excuse him from work from 5-5-21. .    If you have any questions or concerns, please don't hesitate to call.     Sincerely,        Kelsi Argueta PA-C

## 2021-05-25 LAB
ALBUMIN SERPL-MCNC: 4.2 G/DL (ref 3.5–5.2)
ALP BLD-CCNC: 47 U/L (ref 40–129)
ALT SERPL-CCNC: 25 U/L (ref 0–40)
ANION GAP SERPL CALCULATED.3IONS-SCNC: 15 MMOL/L (ref 7–16)
AST SERPL-CCNC: 17 U/L (ref 0–39)
BASOPHILS ABSOLUTE: 0.07 E9/L (ref 0–0.2)
BASOPHILS RELATIVE PERCENT: 0.9 % (ref 0–2)
BILIRUB SERPL-MCNC: 0.3 MG/DL (ref 0–1.2)
BUN BLDV-MCNC: 25 MG/DL (ref 6–23)
CALCIUM SERPL-MCNC: 10.1 MG/DL (ref 8.6–10.2)
CHLORIDE BLD-SCNC: 97 MMOL/L (ref 98–107)
CHOLESTEROL, TOTAL: 188 MG/DL (ref 0–199)
CO2: 23 MMOL/L (ref 22–29)
CREAT SERPL-MCNC: 1 MG/DL (ref 0.7–1.2)
EOSINOPHILS ABSOLUTE: 0.21 E9/L (ref 0.05–0.5)
EOSINOPHILS RELATIVE PERCENT: 2.7 % (ref 0–6)
GFR AFRICAN AMERICAN: >60
GFR NON-AFRICAN AMERICAN: >60 ML/MIN/1.73
GLUCOSE BLD-MCNC: 137 MG/DL (ref 74–99)
HBA1C MFR BLD: 8.1 % (ref 4–5.6)
HCT VFR BLD CALC: 47.2 % (ref 37–54)
HDLC SERPL-MCNC: 25 MG/DL
HEMOGLOBIN: 15.7 G/DL (ref 12.5–16.5)
IMMATURE GRANULOCYTES #: 0.04 E9/L
IMMATURE GRANULOCYTES %: 0.5 % (ref 0–5)
LDL CHOLESTEROL CALCULATED: ABNORMAL MG/DL (ref 0–99)
LYMPHOCYTES ABSOLUTE: 2.27 E9/L (ref 1.5–4)
LYMPHOCYTES RELATIVE PERCENT: 28.9 % (ref 20–42)
MCH RBC QN AUTO: 31.3 PG (ref 26–35)
MCHC RBC AUTO-ENTMCNC: 33.3 % (ref 32–34.5)
MCV RBC AUTO: 94 FL (ref 80–99.9)
MONOCYTES ABSOLUTE: 0.62 E9/L (ref 0.1–0.95)
MONOCYTES RELATIVE PERCENT: 7.9 % (ref 2–12)
NEUTROPHILS ABSOLUTE: 4.65 E9/L (ref 1.8–7.3)
NEUTROPHILS RELATIVE PERCENT: 59.1 % (ref 43–80)
PDW BLD-RTO: 13.4 FL (ref 11.5–15)
PLATELET # BLD: 328 E9/L (ref 130–450)
PMV BLD AUTO: 9.9 FL (ref 7–12)
POTASSIUM SERPL-SCNC: 4 MMOL/L (ref 3.5–5)
RBC # BLD: 5.02 E12/L (ref 3.8–5.8)
SODIUM BLD-SCNC: 135 MMOL/L (ref 132–146)
TOTAL PROTEIN: 7.7 G/DL (ref 6.4–8.3)
TRIGL SERPL-MCNC: 628 MG/DL (ref 0–149)
TSH SERPL DL<=0.05 MIU/L-ACNC: 1.28 UIU/ML (ref 0.27–4.2)
VLDLC SERPL CALC-MCNC: ABNORMAL MG/DL
WBC # BLD: 7.9 E9/L (ref 4.5–11.5)

## 2021-09-29 ENCOUNTER — EVALUATION (OUTPATIENT)
Dept: SPEECH THERAPY | Age: 61
End: 2021-09-29
Payer: COMMERCIAL

## 2021-09-29 DIAGNOSIS — R47.1 DYSARTHRIA: Primary | ICD-10-CM

## 2021-09-29 PROCEDURE — 92523 SPEECH SOUND LANG COMPREHEN: CPT | Performed by: SPEECH-LANGUAGE PATHOLOGIST

## 2021-10-01 ENCOUNTER — TREATMENT (OUTPATIENT)
Dept: SPEECH THERAPY | Age: 61
End: 2021-10-01
Payer: COMMERCIAL

## 2021-10-01 ENCOUNTER — EVALUATION (OUTPATIENT)
Dept: PHYSICAL THERAPY | Age: 61
End: 2021-10-01
Payer: COMMERCIAL

## 2021-10-01 DIAGNOSIS — G81.91 HEMIPARESIS, RIGHT (HCC): Primary | ICD-10-CM

## 2021-10-01 DIAGNOSIS — R47.1 DYSARTHRIA: Primary | ICD-10-CM

## 2021-10-01 PROCEDURE — 92507 TX SP LANG VOICE COMM INDIV: CPT | Performed by: SPEECH-LANGUAGE PATHOLOGIST

## 2021-10-01 PROCEDURE — 97162 PT EVAL MOD COMPLEX 30 MIN: CPT | Performed by: PHYSICAL THERAPIST

## 2021-10-01 NOTE — PROGRESS NOTES
[facility] OUTPATIENT REHABILITATION  PHYSICAL THERAPY INITIAL EVALUATION         Date:  10/1/2021   Patient: Sandra Lamb  : 1960  MRN: [id number]  Referring Provider: Lethaniel Kayser, MD  [facility]     Medical Diagnosis:   G81.91 (ICD-10-CM) - Hemiplegia, unspecified affecting right dominant side   R47.01 (ICD-10-CM) - Aphasia         Physician Order: Eval and Treat      SUBJECTIVE:     History: Patient reports no problem with gait on level, uneven surfaces, or stairs    Chief complaint: no gross motor issues    Pain:   Current: 010         Imaging results: No results found. Past Medical History:  Past Medical History:   Diagnosis Date    Chronic back pain     Hyperlipidemia     Hypertension     Neck pain      No past surgical history on file. Medications:   Current Outpatient Medications   Medication Sig Dispense Refill    azithromycin (ZITHROMAX) 250 MG tablet 500mg on day 1 followed by 250mg on days 2 - 5 6 tablet 0    aspirin 81 MG tablet Take 81 mg by mouth daily      meclizine (ANTIVERT) 25 MG tablet 2 times daily      ranitidine (ZANTAC) 300 MG tablet daily      Naproxen Sodium ER (NAPRELAN) 750 MG TB24 Take 750 mg by mouth 2 times daily 60 tablet 5    FLUoxetine (PROZAC) 20 MG capsule       HYDROcodone-acetaminophen (NORCO)  MG per tablet Take 1 tablet by mouth every 6 hours as needed for Pain . 90 tablet 0    Dulaglutide (TRULICITY) 1.5 WO/2.0YD SOPN Inject into the skin every 7 days      insulin glargine (TOUJEO SOLOSTAR) 300 UNIT/ML injection pen Inject into the skin nightly      glipiZIDE (GLUCOTROL) 10 MG tablet   0    fenofibrate (TRICOR) 145 MG tablet   0    metFORMIN (GLUCOPHAGE) 500 MG tablet Take 500 mg by mouth 2 times daily (with meals)      lisinopril (PRINIVIL;ZESTRIL) 20 MG tablet Take 20 mg by mouth daily.       LORazepam (ATIVAN) 1 MG tablet Take 1 mg by mouth every 6 hours as needed for Anxiety.  colesevelam (WELCHOL) 625 MG tablet Take 1,875 mg by mouth 2 times daily (with meals).  amLODIPine (NORVASC) 10 MG tablet Take 10 mg by mouth daily.  simvastatin (ZOCOR) 10 MG tablet Take 10 mg by mouth daily.  Omeprazole Magnesium (PRILOSEC OTC PO) Take  by mouth as needed. No current facility-administered medications for this visit. Occupation: [employer]  for 32 years. Physical demands include: walking, standing, computer operation. Status: full time    Exercise regimen: none    Hobbies: none    Patient Goals: return of normal speech    Contraindications/Precautions: none    OBJECTIVE:     Estimated body mass index is 35.25 kg/m² as calculated from the following:    Height as of 5/6/21: 6' 3\" (1.905 m). Weight as of 5/6/21: 282 lb (127.9 kg). Observations: well nourished male, normal affect    Inspection: normal orthopedic exam       Gait: normal , normal on stairs up/down 3 steps x 5    Functional Strength:   Able to toe walk, heel walk, and squat. Range of Motion:    Upper Extremity:   Right:   [x] Normal   [] Limited    Left:   [x] Normal   [] Limited     Lower Extremity:   Right:   [x] Normal   [] Limited    Left:   [x] Normal   [] Limited       Strength:   R UE: 5/5   L UE: 5/5   R LE: 5/5   L LE: 5/5    Functional Mobility/Tests:  Test    Basic Transfer independent   Sit/Stand independent   Squat    Double stance, feet together, eyes open good   Double stance, feet together, eyes closed good           Dysdiadochokinesia Not present    Dysmetria  Not present       An older adult who takes ? 12 seconds to complete the TUG is at high risk for falling. 30-Sec. Chair Stand Test:  Number:  _17_  Test date: 10/1/2021    Notes: no deficits noted     Scoring criteria.       Chair StandBelow Average Scores Age  Men  Women    60-64  < 14  < 12    65-69  < 12  < 11    70-74  < 12  < 10    75-79  < 11  < 10    80-84  < 10  < 9    85-89  < 8  < 8 90-94  < 7  < 4         ASSESSMENT     Toula Leventhal is doing well from a physical therapy standpoint and doesn't require skilled PT at this time. [x] There are no barriers affecting plan of care or recovery    [] Barriers to this patient's plan of care or recovery include:      Domestic Concerns:  [x] No  [] Yes:        PLAN      Discharge. If anything changes in the future I would be happy to see Toula Leventhal. Patient understands diagnosis/prognosis and consents to treatment, plan and goals: [x] Yes    [] No     Thank you for the opportunity to work with your patient. If you have questions or comments, please contact me at [phone]; fax: [phone].     Electronically signed by: Marisa Conner PT

## 2021-10-05 NOTE — PROGRESS NOTES
while in the hospital.  Jonh Morales reports that she has noticed some improvement in his pronunciation in the last week. Durel Goodell states that he frequently bites the right side of his tongue due to numbness and reports right side oral leakage with liquids at times. He states that he occasionally has difficulty with thin liquids but he was observed today with continuous cup sips of water and demonstrated no overt s/s of distress. They were instructed to monitor and document episodes and we will review next session. If they continue, we will request an order for a video swallow study. MOTOR SPEECH       Oral Peripheral Examination   Generalized oral weakness, Right labiobuccal weakness and Right lingual deviation     Parameters of Speech Production  Respiration:  Adequate for speech production  Articulation:  Distortion - marked with decreased coordination noted  Resonance:  Within functional limits  Quality:   Within functional limits  Pitch: Within functional limits  Intensity: Within functional limits  Fluency:  Intact  Prosody Intact    RECEPTIVE LANGUAGE    Comprehension of Yes/No Questions:    Within functional limits    Process  Simple Verbal Commands:   Within functional limits  Process Intermediate Verbal Commands:   Within functional limits  Process Complex Verbal Commands:     Within functional limits    Comprehension of Conversation:      Within functional limits      EXPRESSIVE LANGUAGE     Serials: Functional    Imitation:  Words   Functional   Sentences Functional    Naming:  (Modality used:  Verbal)  Confrontation Naming  Functional  Functional Description  Functional  Response Naming: Functional    Conversation:      Conversation was within functional limits    COGNITION     Attention/Orientation  Attention: Sustained attention   Orientation:  Oriented to Person, Place, Date, Reason for hospitalization    Memory   Immediate Recall: Repeated 3/3    Delayed Recall:   Recalled 3/3    Long Term Recall: Recalled Address, Birthdate, Age and Family    Organization/Problem Solving/Reasoning   Verbal Sequencing:   Functional        Verbal Problem solving:   Functional          CLINICAL OBSERVATIONS NOTED DURING THE EVALUATION  Within functional limits                  EDUCATION:   The Speech Language Pathologist (SLP) completed education regarding results of evaluation and that intervention is warranted at this time. Learner: Patient and Family  Education: Reviewed results and recommendations of this evaluation  Evaluation of Education:  [employer] understanding    This plan may be re-evaluated and revised as warranted. Treatment goals discussed with Patient and Family   The Patient and Family understand(s) the diagnosis, prognosis and plan of care     Evaluation Time includes thorough review of current medical information, gathering information on past medical history/social history and prior level of function, completion of standardized testing/informal observation of tasks, assessment of data and education on plan of care and goals. CPT Codes    Evaluation: 60033 Evaluation of Speech Sound Language Comprehension     60 Minutes     Treatment: 30187 Speech/Language Therapy     45-60 Minutes    The admitting diagnosis and active problem list, as listed below have been reviewed prior to initiation of this evaluation.         ACTIVE PROBLEM LIST:   Patient Active Problem List   Diagnosis    Spinal stenosis in cervical region    Thoracic or lumbosacral neuritis or radiculitis, unspecified    Brachial neuritis or radiculitis    Displacement of cervical intervertebral disc without myelopathy    Displacement of lumbar intervertebral disc without myelopathy    Peripheral neuropathy    Lumbar spondylosis    Osteoarthritis    Cervical radiculopathy    Lumbar radiculopathy    Radiculopathy due to lumbar intervertebral disc disorder    Intervertebral disc disorders with radiculopathy, lumbar region    Osteoarthritis of spine with radiculopathy, cervical region       Gerard Hinojosa MA/CCC-SLP  [address] [place] [address]           Phone: [phone]      If you have any questions or concerns, please don't hesitate to call. Thank you for your referral.    Physician/Provider Signature:________________________________Date:__________________  By signing above, the therapists plan is approved by the physician/provider. All patients under [employer] must be signed by physician/provider.

## 2021-10-06 ENCOUNTER — TREATMENT (OUTPATIENT)
Dept: SPEECH THERAPY | Age: 61
End: 2021-10-06
Payer: COMMERCIAL

## 2021-10-06 DIAGNOSIS — R47.1 DYSARTHRIA: Primary | ICD-10-CM

## 2021-10-06 PROBLEM — G81.91 HEMIPARESIS, RIGHT (HCC): Status: ACTIVE | Noted: 2021-10-06

## 2021-10-06 PROCEDURE — 92507 TX SP LANG VOICE COMM INDIV: CPT | Performed by: SPEECH-LANGUAGE PATHOLOGIST

## 2021-10-08 ENCOUNTER — TREATMENT (OUTPATIENT)
Dept: SPEECH THERAPY | Age: 61
End: 2021-10-08
Payer: COMMERCIAL

## 2021-10-08 DIAGNOSIS — R47.1 DYSARTHRIA: Primary | ICD-10-CM

## 2021-10-08 PROCEDURE — 92507 TX SP LANG VOICE COMM INDIV: CPT | Performed by: SPEECH-LANGUAGE PATHOLOGIST

## 2021-10-08 NOTE — PROGRESS NOTES
Patient seen for 45 minute session this date. Patient reports he is doing 'ok' but continues with much frustration about the change in his speech skills. Today, he was instructed on all non-resistive and resistive oral motor exercises. We worked with a mirror and he was able to display understanding of all. He was provided with a written list of the exercises for reference. We discussed and agreed upon a practice schedule. He was also instructed in over articulation strategies to implement while we worked on saying tongue twisters. He completed the task with fair performance with mod/max cues to slow rate and articulate each sound. He struggled with /r/, /f/, /v/, /t/, /d/, and /l/. Homework activities provided and encouraged. Will continue. Deonna Mcarthur.  Kareem De La Vega MA/[facility]-SLP  [id number]    [facility] [address]

## 2021-10-13 ENCOUNTER — TREATMENT (OUTPATIENT)
Dept: SPEECH THERAPY | Age: 61
End: 2021-10-13
Payer: COMMERCIAL

## 2021-10-13 ENCOUNTER — EVALUATION (OUTPATIENT)
Dept: OCCUPATIONAL THERAPY | Age: 61
End: 2021-10-13
Payer: COMMERCIAL

## 2021-10-13 DIAGNOSIS — R47.1 DYSARTHRIA: Primary | ICD-10-CM

## 2021-10-13 DIAGNOSIS — G81.91 HEMIPLEGIA AFFECTING RIGHT DOMINANT SIDE, UNSPECIFIED ETIOLOGY, UNSPECIFIED HEMIPLEGIA TYPE (HCC): Primary | ICD-10-CM

## 2021-10-13 PROCEDURE — 97165 OT EVAL LOW COMPLEX 30 MIN: CPT | Performed by: OCCUPATIONAL THERAPIST

## 2021-10-13 PROCEDURE — 92507 TX SP LANG VOICE COMM INDIV: CPT | Performed by: SPEECH-LANGUAGE PATHOLOGIST

## 2021-10-13 NOTE — PROGRESS NOTES
OCCUPATIONAL THERAPY INITIAL EVALUATION    [facility] ANCILLARY SERVICES  [facility] OCCUPATIONAL THERAPY   [place] [address]  Dept: [address] OT Fax: [phone]    Date:  10/13/2021  Initial Evaluation Date: 10-13-21     Evaluating Therapist: Marcia Lopez OT/SRIRAM  [id number]    Patient Name:  Ian Westfall    :  1960    Restrictions/Precautions:   Activity as tolerated, Low fall risk  Diagnosis:  G81.91 (ICD-10-CM) - Hemiplegia, unspecified affecting right dominant side   Date of Surgery/Injury: 1 month ago    Insurance/Certification information:  [address] of care signed (Y/N): N  Visit# / total visits: Magdi Husain     Referring Practitioner:  Dr Anabela Mcdaniel  Specific Practitioner Orders: Needs OT/ evaluate and treat    Assessment of current deficits   [] Functional mobility   [x] ADLs  [x] Strength   [] Cognition   [] Functional transfers   [] IADLs  [] Safety Awareness  [] Endurance   [x] Fine Motor Coordination  [] Balance  [] Vision/perception  [] Sensation    [] Gross Motor Coordination [] ROM  [] Pain   [] Edema    [] Scar Adhesion/Skin Integrity     OT PLAN OF CARE   OT POC based on physician orders, patient diagnosis and results of clinical assessment    Frequency/Duration: 1x/ week x 4 weeks  Specific OT Treatment to include:     [x] Instruction in HEP                   Modalities:  [x] Therapeutic Exercise        [] Ultrasound               [] Electrical Stimulation/Attended  [] PROM/Stretching                    [] Fluidotherapy          []  Paraffin                   [] AAROM  [] AROM                 [] Iontophoresis:   [] Tendon Glides                                               [] Neuromuscular Re-Ed            [] ADL/IADL re-training    [x] Therapeutic Activity       [] Pain Management with/without modalities PRN                 [] Manual Therapy                      [] Splinting                      [] Scar Management                   []Joint Protection/Training  []Ergonomics                             [] Joint Mobilization        [] Adaptive Equipment Assessment/Training                             [] Manual Edema Mobilization   [] Myofascial Release                 [] Energy Conservation/Work Simplification  [x] FM Coordination       [] Safety retraining/education per  individual diagnosis/goals  [] Desensitization       Patient Specific Goal: \" To get back to where I was before\"                             GOALS (Long term same as Short term):  1) Patient will demonstrate good understanding of home program (exercises/activities/diagnosis/prognosis/goals) with good accuracy. 2) Patient will demonstrate increased /pinch strength of at least 10  of their right hand. 3) Increase in fine motor function as evidenced by decreased time to complete  MRMT test by at least 10 seconds with improved fluidity/ accuracy of handwriting. 4) Patient will report ADL functions as independent with improved fluidity using the right hand. Past Medical History:   Past Medical History:   Diagnosis Date    Chronic back pain     Hyperlipidemia     Hypertension     Neck pain      Past Surgical History: No past surgical history on file. 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 Home Living:   Prior Level of Function: independent    Cognition:   Alert/Oriented x3     IADL STATUS:   Ind Mod I Min A Mod A Max A Dep Other   Homemaking Responsibility  x        Shopping Responsibility:  x        Mode of Transportation: car      x Not driving at this time   Leisure & Hobbies: home improvement       NA   Work: [employer]        On leave     Comments:Pt reports he is able to complete most daily activities with intermittent fatigue issues. He is not currently driving. However, following this exam there is no evidence he can't resume driving. He is doing really well.      ADL STATUS:   Ind Mod I Min A Mod A Max A Dep Other   Feeding: x         Grooming:  x     Brushing teeth, shaving is awkward   Bathing: x         UE Dressing: x         LE Dressing: x         Toileting: x         Transfers: x           Comments: Writing requires increased time and concentration. Issues are noted with microscopic writing and with writing on a slope. Pain Level: No pain    UE Assessment: RHD    B UE AROM: WNL    B UE strength: WNL    Sensation: R  Able To Sense (Y) / Unable to Sense (N)  SEMMES-BERNARDA Thumb 2nd Digit 3rd Digit  4th Digit  5th Digit    Normal Touch  Size: 2.83 x x x x x   Diminished Light Touch   Size: 3.61        Diminished Protective Sense  Size: 4.31        Loss of Protective Sense   Size: 4.56        Loss of Sensation  Size: 6.65          Tone: Normal    Vision / Perception: No deficits    Dynamometer (setting 2):     Left: 75#/ 85#/ 95# (av 85#)      Right: 80#/ 75#/ 82# (av 79#)    Pinch Meter:   Lateral: Left= 18#,Right= 21.5#    Palmar 3 point: Left= 17.5#, Right= 16#    9 Hole Peg Test:   Left: 25 sec   Right: 28 sec with c/o of being \"awkward\"    MRMT        Right: 1 min 39 sec with fatigue and intermittent decreased fluidity/ accuracy of movement      QuickDASH Score: 6.8 % disability     Eval Complexity: Low  Profile and History- interview  Assessment of Occupational Performance and Identification of Deficits- 3 performance deficits   Clinical Decision Making- no modifications or co-morbidities    Rehab Potential:                                 [x] Good  [] Fair  [] Poor        Suggested Professional Referral:       [x] No  [] Yes:  Barriers to Goal Achievement[de-identified]          [x] No  [] Yes:  Domestic Concerns:                           [x] No  [] Yes:       Patient. Education:  [x] Plans/Goals, Risks/Benefits discussed  [] Home exercise program  Method of Education: [x] Verbal  [] Demo  [] Written  Comprehension of Education:  [x] Verbalizes understanding.   [] Demonstrates

## 2021-10-13 NOTE — PROGRESS NOTES
Patient seen for 60 minute in person session this date. Patient reports doing 'ok'. Today we worked on oral motor exercises with improved overall strength and coordination noted with patient completing exercises with fair+ performance. We worked on /f/ and /v/ in sentences today with patient completing task with fair- performance and mod/max cues. Oral motor coordination fair- during conversation. Worked on implementation of over articulation strategies, especially slowed rate with mod cues needed. Homework activities encouraged. Will continue. Rain Hairston.  Peng Staples MA/CCC-SLP  [id number]    [facility] [address]

## 2021-10-14 NOTE — PROGRESS NOTES
Patient seen for 60 minute in person session this date. Patient reports he is doing ok. Today we focused on /f, v, l, th/ in words in sentences. He struggled with all sounds but most with /f/ and /v/. Mod cues were needed initially for /l/ and /th/ but was able to self correct with good performance by session end.  /f/ and /v/ needed max cues throughout to achieve fair performance. He is doing well with over articulation strategies with structured conversation tasks today with fair+ performance. Homework activities encouraged. Will continue. Donnalori Goel.  David Spicer MA/CCC-SLP  [id number]    [facility] [id number]

## 2021-10-15 ENCOUNTER — TREATMENT (OUTPATIENT)
Dept: SPEECH THERAPY | Age: 61
End: 2021-10-15
Payer: COMMERCIAL

## 2021-10-15 DIAGNOSIS — R47.1 DYSARTHRIA: Primary | ICD-10-CM

## 2021-10-15 PROCEDURE — 92507 TX SP LANG VOICE COMM INDIV: CPT | Performed by: SPEECH-LANGUAGE PATHOLOGIST

## 2021-10-20 ENCOUNTER — TREATMENT (OUTPATIENT)
Dept: SPEECH THERAPY | Age: 61
End: 2021-10-20
Payer: COMMERCIAL

## 2021-10-20 DIAGNOSIS — R47.1 DYSARTHRIA: Primary | ICD-10-CM

## 2021-10-20 PROCEDURE — 92507 TX SP LANG VOICE COMM INDIV: CPT | Performed by: SPEECH-LANGUAGE PATHOLOGIST

## 2021-10-20 NOTE — PROGRESS NOTES
Patient seen for 45 minute in person session this date. Patient reports doing well. Today, we worked on oral motor exercises with fair+ performance and min cues; We worked on initial/medial/final /f, v, l, th/ in words in sentences. He did well with /l/ and /th/ achieving fair+/good performance with min cues. Continues to have difficulty with /f/ and /v/ in all positions during conversation. Structured sentences completed with fair+/good production but decreases to fair-/poor during conversations. Some anomia noted during conversation but easily works through it to alternate word production. Homework tasks encouraged. Will continue. Alejandro President.  Merlin Pick, MA/CCC-SLP  [id number]    [facility] [address]

## 2021-10-21 NOTE — PROGRESS NOTES
Patient seen for 60 minute in person session this date. Patient doing well per his report. Today, we continued working on improving production of /f/, v/, l/, and /th/ to improve functional intelligibility. He needed mod/max cues to complete tasks with fair+ performance. He reports that he has been working on exercises with no issues/difficulties. Patient reports that he has been having some difficulty with word finding and 'stumbling' over words during conversations. We worked on structured conversations today with patient demonstrating mild anomia inconsistently. Discussed strategies to implement during these episodes. Patient expressed understanding. Homework exercises encouraged. Will continue. Heron Jameson.  Jovanny Mccormick MA/CCC-SLP  [id number]    CPT [id number]

## 2021-10-22 ENCOUNTER — TREATMENT (OUTPATIENT)
Dept: SPEECH THERAPY | Age: 61
End: 2021-10-22
Payer: COMMERCIAL

## 2021-10-22 ENCOUNTER — TREATMENT (OUTPATIENT)
Dept: OCCUPATIONAL THERAPY | Age: 61
End: 2021-10-22
Payer: COMMERCIAL

## 2021-10-22 DIAGNOSIS — G81.91 HEMIPLEGIA AFFECTING RIGHT DOMINANT SIDE, UNSPECIFIED ETIOLOGY, UNSPECIFIED HEMIPLEGIA TYPE (HCC): Primary | ICD-10-CM

## 2021-10-22 DIAGNOSIS — R47.1 DYSARTHRIA: Primary | ICD-10-CM

## 2021-10-22 PROCEDURE — 92507 TX SP LANG VOICE COMM INDIV: CPT | Performed by: SPEECH-LANGUAGE PATHOLOGIST

## 2021-10-22 PROCEDURE — 97110 THERAPEUTIC EXERCISES: CPT | Performed by: OCCUPATIONAL THERAPIST

## 2021-10-22 PROCEDURE — 97530 THERAPEUTIC ACTIVITIES: CPT | Performed by: OCCUPATIONAL THERAPIST

## 2021-10-22 NOTE — PROGRESS NOTES
[facility] OCCUPATIONAL THERAPY   [address]  [place] [address]  Dept: [address] OT Fax: [phone]    OCCUPATIONAL THERAPY PROGRESS NOTE    Date:  10/22/2021  Initial Evaluation Date: 10-13-21    Patient Name:  Vinod Esquivel    :  1960  Restrictions/Precautions: Activity as tolerated, Low fall risk  Diagnosis:  G81.91 (ICD-10-CM) - Hemiplegia, unspecified affecting right dominant side   Date of Surgery/Injury: 1 month ago     Insurance/Certification information:  [address] of care signed (Y/N): N  Visit# / total visits: Eval+      Referring Practitioner:  Dr Gege Leyva  Specific Practitioner Orders: Needs OT/ evaluate and treat     Assessment of current deficits   []? Functional mobility             [x]? ADLs          [x]? Strength                  []? Cognition   []? Functional transfers           []? IADLs         []? Safety Awareness  []? Endurance   [x]? Fine Motor Coordination    []? Balance      []? Vision/perception   []? Sensation     []? Gross Motor Coordination []? ROM           []? Pain                        []? Edema          []? Scar Adhesion/Skin Integrity      OT PLAN OF CARE   OT POC based on physician orders, patient diagnosis and results of clinical assessment     Frequency/Duration: 1x/ week x 4 weeks  Specific OT Treatment to include:      [x]? Instruction in HEP                   Modalities:  [x]?  Therapeutic Exercise                 []? Ultrasound               []? Electrical Stimulation/Attended  []? PROM/Stretching                    []? Fluidotherapy          []?  Paraffin                   []? AAROM  []? AROM                 []? Iontophoresis:   []? Tendon Yevonne Ragley  []? Neuromuscular Re-Ed            []? ADL/IADL re-training    [x]?  Therapeutic Activity                  []? Pain Management with/without modalities PRN                 []? Manual Therapy                      []? Splinting   []? Scar Management                   []?Joint Protection/Training  []? Ergonomics                             []? Joint Mobilization                      []? Adaptive Equipment Assessment/Training                             []? Manual Edema Mobilization   []? Myofascial Release                 []? Energy Conservation/Work Simplification  [x]? FM Coordination           []? Safety retraining/education per  individual diagnosis/goals  []? Desensitization        Patient Specific Goal: \" To get back to where I was before\"                             GOALS (Long term same as Short term):  1) Patient will demonstrate good understanding of home program (exercises/activities/diagnosis/prognosis/goals) with good accuracy. 2) Patient will demonstrate increased /pinch strength of at least 10  of their right hand. 3) Increase in fine motor function as evidenced by decreased time to complete  MRMT test by at least 10 seconds with improved fluidity/ accuracy of handwriting. 4) Patient will report ADL functions as independent with improved fluidity using the right hand.      Pain Level: No pain complaints    Subjective: \" I can shave and brush my teeth easier. \"  Objective:  Updated POC to be completed by last visit. INTERVENTION: COMPLETED: SPECIFICS/COMMENTS:   Modality:               Coordination ex     FM exercise x - xsmall screw assembly   In hand manipulation X  x - exercise balls (challenging)  - 4 coins and place with heads up/ compensation observed   Writing x - tracing basic ex/ with marker/ fine pen                  Strengthening:     Hand strengthening x - red 3#/ 5# green  digiflex 20x composite, each digit 10x  - medium blend exercise putty/ gripping and rolling alternating pinch        Other:     HEP x - in hand manipulation with golf balls, hand putty ex          Assessment/Comments: Pt is making Good progress toward stated plan of care.  Tx completed with a focus on FM/ manipulation skills and hand strengthening. All exercise was tolerated well with periodic brief rests due to fatigue. Will continue.     -Rehab Potential: Good  -Requires OT Follow Up: Yes  Time In:1030            Time Out: 1115             Visit #: Eval +1    Treatment Charges: Mins Units   Modalities     Ther Exercise 15 1   Manual Therapy     Thera Activities 30 2   ADL/Home Mgt      Neuro Re-education     Group Therapy     Non-Billable Service Time     Other     Total Time/Units 45 3       -Response to Treatment: Pt tolerated session well with the exception of in hand manipulation activity. Goals: Goals for pt can be seen on initial eval occurring on 10-13-21    Plan:   [x]  Continue Plan of care: Continue hand strengthening and coordination activities. Pt education continues at each visit to obtain maximum benefits from skilled OT intervention.   []  [address] [facility] of care:   []  Discharge:      Riri Leach OT/L  [id number]

## 2021-10-26 NOTE — PROGRESS NOTES
Patient seen for 60 minute in person session this date. Patient reports doing well. Today, we worked on word finding and intelligibility in conversation. He completed the word finding tasks with good performance with min cues. His intelligibility was fair today with continued difficulty with /f, v, th, l/ due to lingual and labial weakness. Over articulation strategies completed with min/mod cues. Homework activities encouraged. Will continue. Jennifer Najera.  Debi Solorzano MA/CCC-SLP  [id number]    CPT [id number]

## 2021-10-27 ENCOUNTER — TREATMENT (OUTPATIENT)
Dept: SPEECH THERAPY | Age: 61
End: 2021-10-27
Payer: COMMERCIAL

## 2021-10-27 DIAGNOSIS — R47.1 DYSARTHRIA: Primary | ICD-10-CM

## 2021-10-27 PROCEDURE — 92507 TX SP LANG VOICE COMM INDIV: CPT | Performed by: SPEECH-LANGUAGE PATHOLOGIST

## 2021-10-28 NOTE — PROGRESS NOTES
Patient seen for 60 minute in person session this date. Patient reports doing ok. Still frustrated with speech pattern but is working on the strategies in conversations with family/friends. Today, we worked on oral motor coordination exercises with patient completing with fair performance with min cues. We worked on over articulation strategies in conversation with patient completing with fair+ performance. Improved self correction noted. Patient noted to have difficulty with final /st/ in words today. Will target these next session. Homework activities encouraged. Will continue. John De Anda.  Jeet Jorgensen MA/CCC-SLP  [id number]    [facility] [address]

## 2021-10-29 ENCOUNTER — TREATMENT (OUTPATIENT)
Dept: OCCUPATIONAL THERAPY | Age: 61
End: 2021-10-29
Payer: COMMERCIAL

## 2021-10-29 DIAGNOSIS — G81.91 HEMIPLEGIA AFFECTING RIGHT DOMINANT SIDE, UNSPECIFIED ETIOLOGY, UNSPECIFIED HEMIPLEGIA TYPE (HCC): Primary | ICD-10-CM

## 2021-10-29 PROCEDURE — 97530 THERAPEUTIC ACTIVITIES: CPT | Performed by: OCCUPATIONAL THERAPIST

## 2021-10-29 PROCEDURE — 97110 THERAPEUTIC EXERCISES: CPT | Performed by: OCCUPATIONAL THERAPIST

## 2021-10-29 NOTE — PROGRESS NOTES
[facility] OCCUPATIONAL THERAPY   [address]  [facility] [address]  Dept: [address] OT Fax: [phone]    OCCUPATIONAL THERAPY PROGRESS NOTE    Date:  10/29/2021  Initial Evaluation Date: 10-13-21    Patient Name:  Cooper Balderas    :  1960  Restrictions/Precautions: Activity as tolerated, Low fall risk  Diagnosis:  G81.91 (ICD-10-CM) - Hemiplegia, unspecified affecting right dominant side   Date of Surgery/Injury: 1 month ago     Insurance/Certification information:  [address] of care signed (Y/N): N  Visit# / total visits: Eval+2 / 4      Referring Practitioner:  Dr Aureliano De Los Santos  Specific Practitioner Orders: Needs OT/ evaluate and treat     Assessment of current deficits   []? Functional mobility             [x]? ADLs          [x]? Strength                  []? Cognition   []? Functional transfers           []? IADLs         []? Safety Awareness  []? Endurance   [x]? Fine Motor Coordination    []? Balance      []? Vision/perception   []? Sensation     []? Gross Motor Coordination []? ROM           []? Pain                        []? Edema          []? Scar Adhesion/Skin Integrity      OT PLAN OF CARE   OT POC based on physician orders, patient diagnosis and results of clinical assessment     Frequency/Duration: 1x/ week x 4 weeks  Specific OT Treatment to include:      [x]? Instruction in HEP                   Modalities:  [x]?  Therapeutic Exercise                 []? Ultrasound               []? Electrical Stimulation/Attended  []? PROM/Stretching                    []? Fluidotherapy          []?  Paraffin                   []? AAROM  []? AROM                 []? Iontophoresis:   []? Tendon Vandana Lugo  []? Neuromuscular Re-Ed            []? ADL/IADL re-training    [x]?  Therapeutic Activity                  []? Pain Management with/without modalities PRN                 []? Manual Therapy                      []? Splinting   []? Scar Management                   []?Joint Protection/Training  []? Ergonomics                             []? Joint Mobilization                      []? Adaptive Equipment Assessment/Training                             []? Manual Edema Mobilization   []? Myofascial Release                 []? Energy Conservation/Work Simplification  [x]? FM Coordination           []? Safety retraining/education per  individual diagnosis/goals  []? Desensitization        Patient Specific Goal: \" To get back to where I was before\"                             GOALS (Long term same as Short term):  1) Patient will demonstrate good understanding of home program (exercises/activities/diagnosis/prognosis/goals) with good accuracy. 2) Patient will demonstrate increased /pinch strength of at least 10  of their right hand. 3) Increase in fine motor function as evidenced by decreased time to complete  MRMT test by at least 10 seconds with improved fluidity/ accuracy of handwriting. 4) Patient will report ADL functions as independent with improved fluidity using the right hand.      Pain Level:     Subjective:  Pt presents with no new complaints. Objective:  Updated POC to be completed by last visit.     INTERVENTION: COMPLETED: SPECIFICS/COMMENTS:   Modality:               Coordination ex     FM exercise  - xsmall screw assembly   In hand manipulation X  X  x - exercise balls (starting to improve)  - 4 coins and place with heads up/ compensation observed  - Purdue pegboard 4 pegs at a time and assmemble/ take off with tweezers   Writing  - tracing basic ex/ with marker/ fine pen                  Strengthening:     Hand strengthening X      x - red 3#/ 5# green  digiflex 20x composite, each digit 10x  - medium blend exercise putty/ gripping and rolling alternating pinch  - hand gripper/ dyn 4-10 reps  - 8# blue clothespin and pom poms using 3 point pinch        Other:     HEP x - in hand manipulation with golf balls, hand putty ex          Assessment/Comments: Pt is making Good progress toward stated plan of care. Tx completed with a focus on FM/ manipulation skills and hand strengthening. All exercise was tolerated well with periodic brief rests due to fatigue. Pt has a Hx of cramping and tightness with persistent use of the right arm (long standing). Care taken to permit rests during tedoius activities. Less fatigue reported during session. Will continue.     -Rehab Potential: Good  -Requires OT Follow Up: Yes  Time In:1030            Time Out: 1115             Visit #: Eval +2    Treatment Charges: Mins Units   Modalities     Ther Exercise 15 1   Manual Therapy     Thera Activities 30 2   ADL/Home Mgt      Neuro Re-education     Group Therapy     Non-Billable Service Time     Other     Total Time/Units 45 3       -Response to Treatment: Pt tolerated session well. He feels use of the arm is getting better but doesn't still \"feel right\". Goals: Goals for pt can be seen on initial eval occurring on 10-13-21    Plan:   [x]  Continue Plan of care: Continue hand strengthening and coordination activities. Pt education continues at each visit to obtain maximum benefits from skilled OT intervention.   []  [address] [facility] of care:   []  Discharge:      Mariaelena Mota OT/L  [id number]

## 2021-11-03 ENCOUNTER — TREATMENT (OUTPATIENT)
Dept: SPEECH THERAPY | Age: 61
End: 2021-11-03
Payer: COMMERCIAL

## 2021-11-03 DIAGNOSIS — R47.1 DYSARTHRIA: Primary | ICD-10-CM

## 2021-11-03 PROCEDURE — 92507 TX SP LANG VOICE COMM INDIV: CPT | Performed by: SPEECH-LANGUAGE PATHOLOGIST

## 2021-11-03 NOTE — PROGRESS NOTES
Patient seen for 60 minute in person session this date. Patient reports doing ok. Today, we worked on generation of words loaded with /f, v/ and /st/. During a structured task and with min cues, he is able to generate these phonemes with good intelligibility. However, during conversation, it falls to fair production with min/mod cues provided. He is noted to becoming more aware of production and is self-monitoring and self-correcting more. His speed of speech production is still delayed although if speed increases, so do the errors in phoneme production. Over articulation strategies are good. Patient reports compliance to practice of oral motor exercises. Homework activities encouraged. Will continue. Teena Barriga.  Jyothi Fernando MA/CCC-SLP  [id number]    CPT [id number]

## 2021-11-05 ENCOUNTER — TREATMENT (OUTPATIENT)
Dept: OCCUPATIONAL THERAPY | Age: 61
End: 2021-11-05
Payer: COMMERCIAL

## 2021-11-05 DIAGNOSIS — G81.91 HEMIPLEGIA AFFECTING RIGHT DOMINANT SIDE, UNSPECIFIED ETIOLOGY, UNSPECIFIED HEMIPLEGIA TYPE (HCC): Primary | ICD-10-CM

## 2021-11-05 PROCEDURE — 97110 THERAPEUTIC EXERCISES: CPT | Performed by: OCCUPATIONAL THERAPY ASSISTANT

## 2021-11-05 PROCEDURE — 97530 THERAPEUTIC ACTIVITIES: CPT | Performed by: OCCUPATIONAL THERAPY ASSISTANT

## 2021-11-05 NOTE — PROGRESS NOTES
[facility] OCCUPATIONAL THERAPY   [address]  [facility] [address]  Dept: [id number] OT Fax: [phone]    OCCUPATIONAL THERAPY PROGRESS NOTE    Date:  2021  Initial Evaluation Date: 10-13-21    Patient Name:  Gianfranco Hood    :  1960  Restrictions/Precautions: Activity as tolerated, Low fall risk  Diagnosis:  G81.91 (ICD-10-CM) - Hemiplegia, unspecified affecting right dominant side   Date of Surgery/Injury: 1 month ago     Insurance/Certification information:  [address] of care signed (Y/N): N  Visit# / total visits: Eval+3/ 4      Referring Practitioner:  Dr Syl Jimenez  Specific Practitioner Orders: Needs OT/ evaluate and treat     Assessment of current deficits   []? Functional mobility             [x]? ADLs          [x]? Strength                  []? Cognition   []? Functional transfers           []? IADLs         []? Safety Awareness  []? Endurance   [x]? Fine Motor Coordination    []? Balance      []? Vision/perception   []? Sensation     []? Gross Motor Coordination []? ROM           []? Pain                        []? Edema          []? Scar Adhesion/Skin Integrity      OT PLAN OF CARE   OT POC based on physician orders, patient diagnosis and results of clinical assessment     Frequency/Duration: 1x/ week x 4 weeks  Specific OT Treatment to include:      [x]? Instruction in HEP                   Modalities:  [x]?  Therapeutic Exercise                 []? Ultrasound               []? Electrical Stimulation/Attended  []? PROM/Stretching                    []? Fluidotherapy          []?  Paraffin                   []? AAROM  []? AROM                 []? Iontophoresis:   []? Tendon Dotty Barley  []? Neuromuscular Re-Ed            []? ADL/IADL re-training    [x]?  Therapeutic Activity                  []? Pain Management with/without modalities PRN                 []? Manual Therapy                      []? Splinting   []? Scar Management                   []?Joint Protection/Training  []? Ergonomics                             []? Joint Mobilization                      []? Adaptive Equipment Assessment/Training                             []? Manual Edema Mobilization   []? Myofascial Release                 []? Energy Conservation/Work Simplification  [x]? FM Coordination           []? Safety retraining/education per  individual diagnosis/goals  []? Desensitization        Patient Specific Goal: \" To get back to where I was before\"                             GOALS (Long term same as Short term):  1) Patient will demonstrate good understanding of home program (exercises/activities/diagnosis/prognosis/goals) with good accuracy. 2) Patient will demonstrate increased /pinch strength of at least 10  of their right hand. 3) Increase in fine motor function as evidenced by decreased time to complete  MRMT test by at least 10 seconds with improved fluidity/ accuracy of handwriting. 4) Patient will report ADL functions as independent with improved fluidity using the right hand.      Pain Level: 0/10 Pt. Reports no pain. Subjective:  Pt presents with no new complaints. Objective:  Updated POC to be completed by last visit.     INTERVENTION: COMPLETED: SPECIFICS/COMMENTS:   Modality:               Coordination ex     FM exercise x - xsmall screw assembly   In hand manipulation X  X  x - exercise balls (starting to improve)  - 5 coins and place with heads up/ compensation observed  - Purdue pegboard 4 pegs at a time and assmemble/ take off with tweezers   Writing  - tracing basic ex/ with marker/ fine pen                  Strengthening:     Hand strengthening X    x  x - red 3#/ 5# green  digiflex 20x composite, each digit 10x  - medium blend exercise putty/ gripping and rolling alternating pinch  - hand gripper/ dyn 4-10 reps  - 8# blue clothespin and pom poms using 3 point pinch        Other:     HEP x - in hand manipulation with golf balls, hand putty ex          Assessment/Comments: Pt is making Good progress toward stated plan of care. Pt. Performed all FM ax's with minimal to no difficulty today. Pt. Seems to only have difficulty with in hand manipulation skills. Will continue to advance as tolerated. -Rehab Potential: Good  -Requires OT Follow Up: Yes  Time In:1030            Time Out: 1115             Visit #: Eval +3    Treatment Charges: Mins Units   Modalities     Ther Exercise 15 1   Manual Therapy     Thera Activities 30 2   ADL/Home Mgt      Neuro Re-education     Group Therapy     Non-Billable Service Time     Other     Total Time/Units 45 3       -Response to Treatment: Pt tolerated session well. He feels use of the arm is getting better but doesn't still \"feel right\". Goals: Goals for pt can be seen on initial eval occurring on 10-13-21    Plan:   [x]  Continue Plan of care: Continue hand strengthening and coordination activities. Pt education continues at each visit to obtain maximum benefits from skilled OT intervention.   []  [address] of care:   []  Discharge:      Bryn Lucas [facility] 2.

## 2021-11-08 NOTE — PROGRESS NOTES
Patient seen for 60 minute in person session this date. Patient reports he is doing well. Today, we worked on oral motor coordination exercises with patient completing with fair performance with slowed rate to improve coordination of movements. Speed of conversational speech appeared increased today with continued fair/fair+ intelligibility. Good use of compensatory strategies/over articulation noted during structured conversational speech. Will decrease to 1x/week to prepare for discharge if progress continues. John De Anda.  Jeet Jorgensen MA/CCC-SLP  [id number]    [facility] [id number]

## 2021-11-12 ENCOUNTER — TREATMENT (OUTPATIENT)
Dept: SPEECH THERAPY | Age: 61
End: 2021-11-12
Payer: COMMERCIAL

## 2021-11-12 ENCOUNTER — TREATMENT (OUTPATIENT)
Dept: OCCUPATIONAL THERAPY | Age: 61
End: 2021-11-12
Payer: COMMERCIAL

## 2021-11-12 DIAGNOSIS — G81.91 HEMIPLEGIA AFFECTING RIGHT DOMINANT SIDE, UNSPECIFIED ETIOLOGY, UNSPECIFIED HEMIPLEGIA TYPE (HCC): Primary | ICD-10-CM

## 2021-11-12 DIAGNOSIS — R47.1 DYSARTHRIA: Primary | ICD-10-CM

## 2021-11-12 PROCEDURE — 97530 THERAPEUTIC ACTIVITIES: CPT | Performed by: OCCUPATIONAL THERAPIST

## 2021-11-12 PROCEDURE — 92507 TX SP LANG VOICE COMM INDIV: CPT | Performed by: SPEECH-LANGUAGE PATHOLOGIST

## 2021-11-12 NOTE — PROGRESS NOTES
[address] [facility] [address]  Dept: [address] OT Fax: [phone]    OCCUPATIONAL THERAPY   PROGRESS NOTE/ DISCHARGE NOTE    Date:  2021  Initial Evaluation Date: 10-13-21    Patient Name:  Ian Westfall    :  1960  Restrictions/Precautions: Activity as tolerated, Low fall risk  Diagnosis:  G81.91 (ICD-10-CM) - Hemiplegia, unspecified affecting right dominant side   Date of Surgery/Injury: 1 month ago     Insurance/Certification information:  [address] of care signed (Y/N): N  Visit# / total visits: Eval+      Referring Practitioner:  Dr Anabela Mcdaniel  Specific Practitioner Orders: Needs OT/ evaluate and treat     Assessment of current deficits   []? Functional mobility             [x]? ADLs          [x]? Strength                  []? Cognition   []? Functional transfers           []? IADLs         []? Safety Awareness  []? Endurance   [x]? Fine Motor Coordination    []? Balance      []? Vision/perception   []? Sensation     []? Gross Motor Coordination []? ROM           []? Pain                        []? Edema          []? Scar Adhesion/Skin Integrity      OT PLAN OF CARE   OT POC based on physician orders, patient diagnosis and results of clinical assessment     Frequency/Duration: 1x/ week x 4 weeks  Specific OT Treatment to include:      [x]? Instruction in HEP                   Modalities:  [x]?  Therapeutic Exercise                 []? Ultrasound               []? Electrical Stimulation/Attended  []? PROM/Stretching                    []? Fluidotherapy          []?  Paraffin                   []? AAROM  []? AROM                 []? Iontophoresis:   []? Tendon Durward Query  []? Neuromuscular Re-Ed            []? ADL/IADL re-training    [x]?  Therapeutic Activity                  []? Pain Management with/without modalities PRN                 []? Manual Therapy   []? Splinting                                   []? Scar Management                   []?Joint Protection/Training  []? Ergonomics                             []? Joint Mobilization                      []? Adaptive Equipment Assessment/Training                             []? Manual Edema Mobilization   []? Myofascial Release                 []? Energy Conservation/Work Simplification  [x]? FM Coordination           []? Safety retraining/education per  individual diagnosis/goals  []? Desensitization        Patient Specific Goal: \" To get back to where I was before\"                             GOALS (Long term same as Short term):  1) Patient will demonstrate good understanding of home program (exercises/activities/diagnosis/prognosis/goals) with good accuracy. (goal met)  2) Patient will demonstrate increased /pinch strength of at least 10  of their right hand. (limited progress- hand strength is slightly decreased from his normal)    - 79#  Lateral pinch-20#    3) Increase in fine motor function as evidenced by decreased time to complete  MRMT test by at least 10 seconds with improved fluidity/ accuracy of handwriting.(progress noted)  MRMT R- 1 min 32 sec with improved accuracy  N[place]- 26 sec with no c/o of awkward  Handwriting is legible in cursive and printing    4) Patient will report ADL functions as independent with improved fluidity using the right hand. ( goal met- pt is doing better with all self care and homemaking task)     Pain Level: 0/10 Pt. Reports no pain. Subjective: \" I feel I am doing better\". Objective:  Updated POC to be completed by last visit.     INTERVENTION: COMPLETED: SPECIFICS/COMMENTS:   Modality:               Coordination ex     FM exercise x - xsmall screw assembly   In hand manipulation      - exercise balls (starting to improve)  - 5 coins and place with heads up/ compensation observed  - Purdue pegboard 4 pegs at a time and assmemble/ take off with sanaz   Writing  - tracing basic ex/ with marker/ fine pen    x - writing categories in cursive and printing  - home word games for writing practice is encouarged             Strengthening:     Hand strengthening X    x   - R 5# green / 7# blue  digiflex 20x composite, each digit 10x  - medium blend exercise putty/ gripping and rolling alternating pinch  - hand gripper/ dyn 4-10 reps  - 8# blue clothespin and pom poms using 3 point pinch        Other:     HEP x - in hand manipulation with golf balls, hand putty ex          Assessment/Comments: Pt is making Good progress toward stated plan of care. Pt feels he is doing much better in using the right arm. Accuracy of hand movement is better although use remains bradykinetic. Hand strength is slow to improve but pt reports less fatigue in using it for daily activities. At this point, no skilled OT is recommended. However, pt is encouraged to contuinue his HEP. -Rehab Potential: Good  -Requires OT Follow Up: Yes  Time In:1115          Time Out: 1200             Visit #: Eval +4    Treatment Charges: Mins Units   Modalities     Ther Exercise     Manual Therapy     Thera Activities 45 3   ADL/Home Mgt      Neuro Re-education     Group Therapy     Non-Billable Service Time     Other     Total Time/Units 45 3       -Response to Treatment: Pt is happy with his progress. His main concern is writing. HEP for improving writing tolerance was discussed. Goals: Goals for pt can be seen on initial eval occurring on 10-13-21    Plan:   []  Continue Plan of care: Continue hand strengthening and coordination activities. Pt education continues at each visit to obtain maximum benefits from skilled OT intervention. []  Alter Plan of care:   [x]  Discharge: Continue HEP.        Santy Carbajal OT/L  [id number]

## 2021-11-19 ENCOUNTER — TREATMENT (OUTPATIENT)
Dept: SPEECH THERAPY | Age: 61
End: 2021-11-19
Payer: COMMERCIAL

## 2021-11-19 DIAGNOSIS — R47.1 DYSARTHRIA: Primary | ICD-10-CM

## 2021-11-19 PROCEDURE — 92507 TX SP LANG VOICE COMM INDIV: CPT | Performed by: SPEECH-LANGUAGE PATHOLOGIST

## 2021-11-19 NOTE — PROGRESS NOTES
Patient seen for 45 minute in person session this date. Patient doing well per his report. Today, we worked on oral motor exercises with emphasis on oral motor coordination movements with increased speed. Patient completed tasks with fair+ performance . We worked on over articulation skills during structured conversational tasks with patient completing with fair+/good performance with mod cues for /f/, /v/ and /th/. Homework activities encouraged  Will continue. Nevin Park.  Ernesto Richardson MA/CCC-SLP  [id number]    [facility] [id number]

## 2021-11-24 NOTE — PROGRESS NOTES
Patient seen for 60 minute in person session this date. Patient reports doing well. He states that some family members have commented on the improvement in his speech. He remains frustrated by the slowed rate of speech. Today, we worked on increasing speed of generation while maintaining over articulation strategies. He completed tasks with fair performance with min cues. Continues to struggle with /f/, /v/, and /sh/ in conversation with min/mod cues provided. Homework activities encouraged. Will continue. Deonna Mcarthur.  Kareem De La Vega MA/[facility]-SLP  [id number]    [facility] [address]

## 2021-12-03 ENCOUNTER — TREATMENT (OUTPATIENT)
Dept: SPEECH THERAPY | Age: 61
End: 2021-12-03
Payer: COMMERCIAL

## 2021-12-03 DIAGNOSIS — R47.1 DYSARTHRIA: Primary | ICD-10-CM

## 2021-12-03 PROCEDURE — 92507 TX SP LANG VOICE COMM INDIV: CPT | Performed by: SPEECH-LANGUAGE PATHOLOGIST

## 2021-12-09 NOTE — PROGRESS NOTES
Patient seen for 60 minute in person session this date. Patient reports doing well. Today, we worked on focus of correct production of all sounds to improve functional intelligibility in conversation. He completed tasks with good performance with min cues for /f/ and /v/. In structured sentence generation, he completes with good performance every time. However, during conversation tasks, he continues to need min cues for /f/ and /v/. We discussed discharge at this time due to patient reaching max potential for goals. He agreed with plan. He was instructed to continue with all exercises and use of over articulation strategies. He was also instructed to contact this therapist with any questions, concerns or changes in condition. He understood and agreed with all. Patient discharged after his session today. Valerio Zuniga.  Sara Bonner MA/[facility]-SLP  [phone]    [facility] [address]